# Patient Record
Sex: MALE | Race: WHITE | HISPANIC OR LATINO | Employment: UNEMPLOYED | URBAN - METROPOLITAN AREA
[De-identification: names, ages, dates, MRNs, and addresses within clinical notes are randomized per-mention and may not be internally consistent; named-entity substitution may affect disease eponyms.]

---

## 2018-01-11 NOTE — MISCELLANEOUS
Message   Recorded as Task   Date: 2016 08:44 AM, Created By: Desiree Berg   Task Name: Medical Complaint Callback   [facility]   Regarding Patient: Konrad Fried, Status: In Progress   CommentColinda Hammer - 03 Mar 2016 8:44 AM    TASK CREATED  Caller: Sho Christensen, Mother; Medical Complaint; [phone]  Pain on his knee  Appointment after 10:00  Call ASAP mother needs to go to work   Teri Cedeno - 03 Mar 2016 9:02 AM    TASK IN [facility] - 03 Mar 2016 9:03 AM    TASK EDITED  LM for parent to call back ASAP  This patient is 20yr old  Need to confirm   If this is the right pt, we cannot see him  Lizeth Llanos - 03 Mar 2016 4:19 PM    TASK EDITED  Msg left on vm requesting cb  Active Problems   1  Atypical chest pain (786 59) (R07 89)  2  Left knee pain (719 46) (M25 562)  3  Need for HPV vaccination (V04 89) (Z23)  4  Need for prophylactic vaccination and inoculation against meningococcus (V03 89) (Z23)  5  Patellar tendonitis (726 64) (M76 50)  6  Patellofemoral syndrome, left (719 46) (M22 2X2)    Current Meds  1  Ibuprofen 600 MG Oral Tablet; TAKE 1 TABLET 4 TIMES DAILY  WITH MEALS AS   NEEDED; Therapy: 13EUF8893 to (Evaluate:48Aqt2491)  Requested for: 72DXP1308; Last   Rx:[id number] Ordered  2  Naproxen 500 MG Oral Tablet; TAKE 1 TABLET EVERY 12 HOURS WITH FOOD AS   NEEDED; Therapy: 82OAP8647 to (Evaluate:04Jri6456); Last Rx:[id number] Ordered    Allergies   1   No Known Drug Allergies    Signatures   Electronically signed by : Harini Devlin, ; Mar  3 2016  4:20PM EST                       (Author)    Electronically signed by : Milena Zepeda, [facility]; Mar  3 2016  6:41PM EST                       (Author)

## 2018-01-16 NOTE — MISCELLANEOUS
Provider Comments  Provider Comments:   Patient is a no-show today for his 620 physical that he scheduled yesterday        Signatures   Electronically signed by : Baljinder Bone, [facility]; Oct 18 2016  6:42PM EST                       (Author)

## 2020-02-21 ENCOUNTER — OFFICE VISIT (OUTPATIENT)
Dept: FAMILY MEDICINE CLINIC | Facility: CLINIC | Age: 25
End: 2020-02-21

## 2020-02-21 VITALS
SYSTOLIC BLOOD PRESSURE: 140 MMHG | TEMPERATURE: 97.6 F | OXYGEN SATURATION: 98 % | WEIGHT: 225 LBS | HEIGHT: 72 IN | DIASTOLIC BLOOD PRESSURE: 60 MMHG | BODY MASS INDEX: 30.48 KG/M2 | RESPIRATION RATE: 16 BRPM | HEART RATE: 88 BPM

## 2020-02-21 DIAGNOSIS — Z02.4 DRIVER'S PERMIT PHYSICAL EXAMINATION: Primary | ICD-10-CM

## 2020-02-21 DIAGNOSIS — Z71.6 TOBACCO ABUSE COUNSELING: ICD-10-CM

## 2020-02-21 DIAGNOSIS — F17.200 TOBACCO DEPENDENCE: ICD-10-CM

## 2020-02-21 PROCEDURE — 3008F BODY MASS INDEX DOCD: CPT | Performed by: FAMILY MEDICINE

## 2020-02-21 PROCEDURE — 99203 OFFICE O/P NEW LOW 30 MIN: CPT | Performed by: FAMILY MEDICINE

## 2020-02-21 NOTE — PROGRESS NOTES
's Physical    Chief Complaint   Patient presents with    Physical Exam     NP here to [facility] care, DMV      /60 (BP Location: Left arm, Patient Position: Sitting, Cuff Size: Large)   Pulse 88   Temp 97 6 °F (36 4 °C) (Temporal)   Resp 16   Ht 5' 11 5" (1 816 m)   Wt 102 kg (225 lb)   SpO2 98%   BMI 30 94 kg/m²      Assessment   1  Normal 's physical  Reviewed medical conditions on 's physical form with patient in detail, all negative  2  Smoker in pre-contemplation phase  Plan   1  Advised to report back to [facility] immediately if any new conditions or limitations develop  Discussed importance of seat belt safety for  and all passengers in the car  Counseled on abstinence from alcohol, drugs, or substances which inhibit ability to operate a vehicle  Also counseled on abstinence from cell phone use or other devices which are distracting while operating a vehicle  Reviewed importance of familiarizing ones self with the rules and regulations outlined in drivers manual  2  Smoking cessation  3  Follow with PCP for routine primary care  There are no diagnoses linked to this encounter  Cherri Gracia Faisal is a 25 y o  male  Information for this visit was provided by patient  The language used was [place]  Patient is New patient  's physical  Patient here for 's physical  Works for a [employer]  Review of Systems   Constitutional: Negative for activity change, appetite change, chills and fever  HENT: Negative for hearing loss  Eyes: Negative for visual disturbance  Respiratory: Negative for shortness of breath  Cardiovascular: Negative for chest pain  Gastrointestinal: Negative for abdominal pain, blood in stool, constipation, diarrhea, nausea and vomiting  Genitourinary: Negative for difficulty urinating and dysuria  Skin: Negative for rash     Neurological: Negative for dizziness, seizures, syncope and light-headedness  Psychiatric/Behavioral: Negative for behavioral problems  Pertinent items are noted in HPI  Social History  - reports that he has been smoking  He has been smoking about 0 50 packs per day  He has never used smokeless tobacco   - reports that he does not drink alcohol    - reports that he does not use drugs  Objective     Physical Exam   Constitutional: He is oriented to person, place, and time  He appears well-developed and well-nourished  No distress  HENT:   Head: Normocephalic and atraumatic  Eyes: Pupils are equal, round, and reactive to light  Conjunctivae and EOM are normal    Neck: Normal range of motion  Cardiovascular: Normal rate, regular rhythm and normal heart sounds  No murmur heard  Pulmonary/Chest: Effort normal and breath sounds normal  No respiratory distress  He has no wheezes  Musculoskeletal: Normal range of motion  He exhibits no edema  Neurological: He is alert and oriented to person, place, and time  He exhibits normal muscle tone  Coordination normal    Psychiatric: He has a normal mood and affect  His behavior is normal    Nursing note and vitals reviewed  Health Information     The following have been reviewed and updated: none    No Known Allergies  No current outpatient medications on file  There is no problem list on file for this patient  No past surgical history on file  No family history on file    Health Maintenance   Topic Date Due    Pneumococcal Vaccine: Pediatrics (0 to 5 Years) and At-Risk Patients (6 to 59 Years) (1 of 1 - PPSV23) 11/17/2001    Hepatitis A Vaccine (2 of 2 - 2-dose series) 07/29/2009    HIV Screening  11/17/2010    BMI: Followup Plan  11/17/2013    Annual Physical  11/17/2013    HPV Vaccine (3 - Male 3-dose series) 09/21/2014    DTaP,Tdap,and Td Vaccines (7 - Td) 01/29/2019    Influenza Vaccine  07/01/2019    Depression Screening PHQ  02/21/2021    BMI: Adult  02/21/2021    Pneumococcal Vaccine: 65+ Years (1 of 2 - PCV13) 11/17/2060    HIB Vaccine  Completed    Hepatitis B Vaccine  Completed    Meningococcal ACWY Vaccine  Completed    IPV Vaccine  Aged Out

## 2020-02-21 NOTE — PATIENT INSTRUCTIONS

## 2020-04-14 ENCOUNTER — TELEPHONE (OUTPATIENT)
Dept: FAMILY MEDICINE CLINIC | Facility: CLINIC | Age: 25
End: 2020-04-14

## 2021-03-11 ENCOUNTER — OFFICE VISIT (OUTPATIENT)
Dept: FAMILY MEDICINE CLINIC | Facility: CLINIC | Age: 26
End: 2021-03-11

## 2021-03-11 VITALS
HEART RATE: 84 BPM | OXYGEN SATURATION: 99 % | DIASTOLIC BLOOD PRESSURE: 68 MMHG | TEMPERATURE: 96.6 F | SYSTOLIC BLOOD PRESSURE: 104 MMHG | RESPIRATION RATE: 18 BRPM | HEIGHT: 72 IN | BODY MASS INDEX: 26.36 KG/M2 | WEIGHT: 194.6 LBS

## 2021-03-11 DIAGNOSIS — M92.522 OSGOOD-SCHLATTER'S DISEASE, LEFT: ICD-10-CM

## 2021-03-11 DIAGNOSIS — Z02.4 DRIVER'S PERMIT PHYSICAL EXAMINATION: Primary | ICD-10-CM

## 2021-03-11 PROCEDURE — 3008F BODY MASS INDEX DOCD: CPT | Performed by: FAMILY MEDICINE

## 2021-03-11 PROCEDURE — 99213 OFFICE O/P EST LOW 20 MIN: CPT | Performed by: FAMILY MEDICINE

## 2021-03-11 PROCEDURE — 3725F SCREEN DEPRESSION PERFORMED: CPT | Performed by: FAMILY MEDICINE

## 2021-03-11 PROCEDURE — 4004F PT TOBACCO SCREEN RCVD TLK: CPT | Performed by: FAMILY MEDICINE

## 2021-03-11 NOTE — PROGRESS NOTES
Chief Complaint   Patient presents with    Physical Exam     drivers permit     Blood Pressure: 104/68, Pulse: 84, Respirations: 18, Temperature: (!) 96 6 °F (35 9 °C), Temp Source: Probe, SpO2: 99 %, Weight - Scale: 88 3 kg (194 lb 9 6 oz), Height: 6' 0 05" (183 cm), Pain Score: 0-No pain    Assessment/Plan  1  Normal 's physical   Reviewed medical conditions on 's physical form with patient in detail, all negative  Form completed and handed back to patient  Counseled on  safety, including seatbelts, driving while impaired, and distracted driving  2  History of Osgood-Schlatter's  Recommended discussing it with sports medicine  Most likely will need physical therapy  3  RTO p r n  The above was discussed in layman's terms  The patient was engaged using the shared-decision making process and verbalized understanding of the treatment plan  All questions were answered to the patient's satisfaction  Diagnoses and all orders for this visit:    's permit physical examination    Other orders  -     influenza vaccine, quadrivalent, 0 5 mL, preservative-free, for adult and pediatric patients 6 mos+ (AFLURIA, FLUARIX, FLULAVAL, FLUZONE)          Subjective/HPI  1  's physical   Medical history significant for Osgood-Harrisoner during early teenage years  He has off and on left knee pain and is interested in finding out if there is anything that can be done  Review of Systems  Constitutional: Negative for activity change, appetite change, chills and fever  Respiratory: Negative for shortness of breath  Cardiovascular: Negative for chest pain  Gastrointestinal: Negative for blood in stool, nausea and vomiting  Genitourinary: Negative for difficulty urinating and dysuria  Psychiatric/Behavioral: Negative for behavioral problems  Pertinent items are noted in chief complaint and HPI  Social History    reports that he has been smoking   He has been smoking about 0 50 packs per day  He has never used smokeless tobacco     reports no history of alcohol use    reports no history of drug use  Objective  Physical Exam   Constitutional: He is oriented to person, place, and time  He appears well-developed  No distress  HENT:   Head: Normocephalic and atraumatic  Eyes: Conjunctivae are normal    Neck: Normal range of motion  Cardiovascular: Normal rate, regular rhythm and normal heart sounds  No murmur heard  Pulmonary/Chest: Effort normal and breath sounds normal  No respiratory distress  He has no wheezes  Musculoskeletal: Normal range of motion  Neurological: He is alert and oriented to person, place, and time  Coordination and gait normal    Psychiatric: His behavior is normal    Nursing note and vitals reviewed  Chart Review/Health Maintenance   The following have been updated: none    No Known AllergiesNo current outpatient medications on file  There is no problem list on file for this patient  No past surgical history on file  No family history on file    Health Maintenance   Topic Date Due    Pneumococcal Vaccine: Pediatrics (0 to 5 Years) and At-Risk Patients (6 to 59 Years) (1 of 1 - PPSV23) 11/17/2001    Hepatitis A Vaccine (2 of 2 - 2-dose series) 07/29/2009    HIV Screening  11/17/2010    BMI: Followup Plan  11/17/2013    BMI: Adult  11/17/2013    Annual Physical  11/17/2013    HPV Vaccine (3 - Male 3-dose series) 09/21/2014    DTaP,Tdap,and Td Vaccines (7 - Td) 01/29/2019    Influenza Vaccine (1) 09/01/2020    Depression Screening PHQ  02/21/2021    HIB Vaccine  Completed    Hepatitis B Vaccine  Completed    Meningococcal ACWY Vaccine  Completed    IPV Vaccine  Aged Out

## 2021-06-29 ENCOUNTER — APPOINTMENT (OUTPATIENT)
Dept: RADIOLOGY | Facility: MEDICAL CENTER | Age: 26
End: 2021-06-29
Payer: COMMERCIAL

## 2021-06-29 VITALS
BODY MASS INDEX: 25.33 KG/M2 | HEIGHT: 72 IN | HEART RATE: 65 BPM | WEIGHT: 187 LBS | DIASTOLIC BLOOD PRESSURE: 74 MMHG | SYSTOLIC BLOOD PRESSURE: 135 MMHG

## 2021-06-29 DIAGNOSIS — M25.562 CHRONIC PAIN OF LEFT KNEE: Primary | ICD-10-CM

## 2021-06-29 DIAGNOSIS — M25.562 CHRONIC PAIN OF LEFT KNEE: ICD-10-CM

## 2021-06-29 DIAGNOSIS — G89.29 CHRONIC PAIN OF LEFT KNEE: ICD-10-CM

## 2021-06-29 DIAGNOSIS — M76.52 PATELLAR TENDINITIS OF LEFT KNEE: ICD-10-CM

## 2021-06-29 DIAGNOSIS — G89.29 CHRONIC PAIN OF LEFT KNEE: Primary | ICD-10-CM

## 2021-06-29 DIAGNOSIS — Z87.39 HISTORY OF OSGOOD-SCHLATTER DISEASE: ICD-10-CM

## 2021-06-29 PROCEDURE — 99244 OFF/OP CNSLTJ NEW/EST MOD 40: CPT | Performed by: STUDENT IN AN ORGANIZED HEALTH CARE EDUCATION/TRAINING PROGRAM

## 2021-06-29 PROCEDURE — 73562 X-RAY EXAM OF KNEE 3: CPT

## 2021-06-29 PROCEDURE — 4004F PT TOBACCO SCREEN RCVD TLK: CPT | Performed by: STUDENT IN AN ORGANIZED HEALTH CARE EDUCATION/TRAINING PROGRAM

## 2021-06-29 PROCEDURE — 3008F BODY MASS INDEX DOCD: CPT | Performed by: STUDENT IN AN ORGANIZED HEALTH CARE EDUCATION/TRAINING PROGRAM

## 2021-06-29 RX ORDER — DEXAMETHASONE SODIUM PHOSPHATE 4 MG/ML
4 INJECTION, SOLUTION INTRA-ARTICULAR; INTRALESIONAL; INTRAMUSCULAR; INTRAVENOUS; SOFT TISSUE AS NEEDED
Qty: 10 ML | Refills: 1 | Status: SHIPPED | OUTPATIENT
Start: 2021-06-29 | End: 2022-03-09 | Stop reason: ALTCHOICE

## 2021-06-29 NOTE — LETTER
Discussed CBC results with Cherri Bryant. All questions answered.    July 1, 2021     [address] [place] [address]    Patient: Emily An   YOB: 1995   Date of Visit: 6/29/2021       Dear Dr Haritha Baron:    Thank you for referring Oswaldo Manzanares to me for evaluation  Below are my notes for this consultation  If you have questions, please do not hesitate to call me  I look forward to following your patient along with you  Sincerely,        Kenya Wiggins MD        CC: No Recipients  Kenya Wiggins MD  7/1/2021  7:51 AM  Signed  1  Chronic pain of left knee  XR knee 3 vw left non injury    Patellar strap    Ambulatory referral to Physical Therapy    dexamethasone (DECADRON) 4 mg/mL   2  Patellar tendinitis of left knee  Patellar strap    Ambulatory referral to Physical Therapy    dexamethasone (DECADRON) 4 mg/mL   3  History of Osgood-Schlatter disease  Patellar strap    Ambulatory referral to Physical Therapy     Orders Placed This Encounter   Procedures    Patellar strap    XR knee 3 vw left non injury    Ambulatory referral to Physical Therapy        Imaging Studies (I personally reviewed images in PACS and report): 1  X-ray left knee 06/29/2021: No acute osseous abnormalities  Slight protuberance of tibial tuberosity    IMPRESSION:  Chronic left knee pain without a precipitating injury   Patellar tendinosis (DDx:  Patellofemoral pain syndrome)  History of Osgood-Schlatter's disease     consultation evaluation    PLAN:  - Clinical exam and radiographic imaging reviewed patient today, with impression as per above  I suspect given his h/o Osgood-Schlatter, his imaging and physical exam shows some protuberance of his tibial tuberosity (Lt>Rt) and does have pain over his patellar tendon on exam   - Patient agreeable to referral to formal physical therapy    - Prescribed patellar strap to be used during activity/ work but is okay to take off during rest and hygienic purposes  - Additionally prescribed Decadron for iontophoresis therapy while attending PT  - In regards to pain control, patient declined prescription NSAIDs  I discussed that he can use OTC NSAIDs/ acetaminophen, icing 20 minutes on/ off as needed for pain    Return in about 6 weeks (around 8/10/2021)  CHIEF COMPLAINT:  Left knee pain    HPI:  Gerber Hwang is a 22 y o  male  who presents in regards to referral from his PCP, Dr Priscilla Hurley, for       Visit 06/29/2021 :  Initial evaluation of left knee pain:  Of note patient reports a history of left knee Osgood-Schlatter disease in his teenage years  He reports his left knee has been an ongoing issue for the past 10 years without a precipitating injury but worsening over the past year  He reports pain located over the anteromedial aspect and "inside the kneecap," constant, and aching in quality with occasional sharp pains  The pain does radiate into his lower back  At its best, he rates the pain as a 5/10 in severity and at its worst he rates the pain as a 9/10  The pain has been ongoing for years, and he states that over the past year he has noticed it worsening  He denies any popping or clicking sensation  Denies knee swelling, numbness/ tingling, erythema, giving out sensation, knee locking in extension, and other ROS as per below  The pain worsens with prolonged ambulation and ascending/descending stairs  He has worsening pain toward the end of the day that does not completely resolve in the morning  He tried physical therapy in his teen years which did not alleviate the pain; he uses these same exercises now at home  He has tried icing, massage, and Advil with temporary relief  Denies prior injuries or surgeries of his left knee in the past     Review of Systems   Constitutional: Negative for chills, diaphoresis and fever  Respiratory: Negative for cough and shortness of breath  Gastrointestinal: Negative for abdominal pain, nausea and vomiting     Musculoskeletal:        As per HPI   Skin: Negative for rash  Neurological:        As per HPI         Medical, Surgical, Family, and Social History    History reviewed  No pertinent past medical history  History reviewed  No pertinent surgical history  Social History   Social History     Substance and Sexual Activity   Alcohol Use Never     Social History     Substance and Sexual Activity   Drug Use Yes    Types: Marijuana    Comment: medical      Social History     Tobacco Use   Smoking Status Current Every Day Smoker    Packs/day: 0 50    Types: Cigarettes   Smokeless Tobacco Never Used     History reviewed  No pertinent family history  No Known Allergies       Physical Exam  /74   Pulse 65   Ht 6' (1 829 m)   Wt 84 8 kg (187 lb)   BMI 25 36 kg/m²     Constitutional:  see vital signs  Gen: well-developed, normocephalic/atraumatic, well-groomed  Eyes: No inflammation or discharge of conjunctiva or lids; sclera clear   Pharynx: no inflammation, lesion, or mass of lips  Neck: supple, no masses, non-distended  MSK: no inflammation, lesion, mass, or clubbing of nails and digits except for other than mentioned below  SKIN: no visible rashes or skin lesions  Pulmonary/Chest: Effort normal  No respiratory distress     NEURO: cranial nerves grossly intact  PSYCH:  Alert and oriented to person, place, and time; recent and remote memory intact; mood normal, no depression, anxiety, or agitation, judgment and insight good and intact     Ortho Exam   Left Knee Exam:  Inspection:  Patient has increased protuberance of his tibial tuberosity of of his left knee compared to his right knee  Erythema: no  Swelling: no  Increased Warmth: no  Tenderness: No tenderness over medial/lateral joint line; pain over patellar tendon and tibial tuberosity  Sensation: equal bilaterally  ROM: 0-130 (  Full flexion aggravates pain over the patellar tendon)  Patellar Apprehension: negative  Patellar Grind: negative  Lachman's: negative  Anterior Drawer: negative  Varus laxity: negative  Valgus laxity: negative  Jong: negative   Thessaly Test: negative     Sensation intact to light touch      Left hip ROM demonstrates no pain actively or passively    No calf tenderness to palpation

## 2021-06-29 NOTE — PROGRESS NOTES
1  Chronic pain of left knee  XR knee 3 vw left non injury    Patellar strap    Ambulatory referral to Physical Therapy    dexamethasone (DECADRON) 4 mg/mL   2  Patellar tendinitis of left knee  Patellar strap    Ambulatory referral to Physical Therapy    dexamethasone (DECADRON) 4 mg/mL   3  History of Osgood-Schlatter disease  Patellar strap    Ambulatory referral to Physical Therapy     Orders Placed This Encounter   Procedures    Patellar strap    XR knee 3 vw left non injury    Ambulatory referral to Physical Therapy        Imaging Studies (I personally reviewed images in PACS and report): 1  X-ray left knee 06/29/2021: No acute osseous abnormalities  Slight protuberance of tibial tuberosity    IMPRESSION:  Chronic left knee pain without a precipitating injury   Patellar tendinosis (DDx:  Patellofemoral pain syndrome)  History of Osgood-Schlatter's disease     consultation evaluation    PLAN:  - Clinical exam and radiographic imaging reviewed patient today, with impression as per above  I suspect given his h/o Osgood-Schlatter, his imaging and physical exam shows some protuberance of his tibial tuberosity (Lt>Rt) and does have pain over his patellar tendon on exam   - Patient agreeable to referral to formal physical therapy  - Prescribed patellar strap to be used during activity/ work but is okay to take off during rest and hygienic purposes  - Additionally prescribed Decadron for iontophoresis therapy while attending PT  - In regards to pain control, patient declined prescription NSAIDs  I discussed that he can use OTC NSAIDs/ acetaminophen, icing 20 minutes on/ off as needed for pain    Return in about 6 weeks (around 8/10/2021)        CHIEF COMPLAINT:  Left knee pain    HPI:  Shahana Henning is a 22 y o  male  who presents in regards to referral from his PCP, Dr Dereck Amezquita, for       Visit 06/29/2021 :  Initial evaluation of left knee pain:  Of note patient reports a history of left knee Osgood-Schlatter disease in his teenage years  He reports his left knee has been an ongoing issue for the past 10 years without a precipitating injury but worsening over the past year  He reports pain located over the anteromedial aspect and "inside the kneecap," constant, and aching in quality with occasional sharp pains  The pain does radiate into his lower back  At its best, he rates the pain as a 5/10 in severity and at its worst he rates the pain as a 9/10  The pain has been ongoing for years, and he states that over the past year he has noticed it worsening  He denies any popping or clicking sensation  Denies knee swelling, numbness/ tingling, erythema, giving out sensation, knee locking in extension, and other ROS as per below  The pain worsens with prolonged ambulation and ascending/descending stairs  He has worsening pain toward the end of the day that does not completely resolve in the morning  He tried physical therapy in his teen years which did not alleviate the pain; he uses these same exercises now at home  He has tried icing, massage, and Advil with temporary relief  Denies prior injuries or surgeries of his left knee in the past     Review of Systems   Constitutional: Negative for chills, diaphoresis and fever  Respiratory: Negative for cough and shortness of breath  Gastrointestinal: Negative for abdominal pain, nausea and vomiting  Musculoskeletal:        As per HPI   Skin: Negative for rash  Neurological:        As per HPI         Medical, Surgical, Family, and Social History    History reviewed  No pertinent past medical history  History reviewed  No pertinent surgical history    Social History   Social History     Substance and Sexual Activity   Alcohol Use Never     Social History     Substance and Sexual Activity   Drug Use Yes    Types: Marijuana    Comment: medical      Social History     Tobacco Use   Smoking Status Current Every Day Smoker    Packs/day: 0 50    Types: Cigarettes   Smokeless Tobacco Never Used     History reviewed  No pertinent family history  No Known Allergies       Physical Exam  /74   Pulse 65   Ht 6' (1 829 m)   Wt 84 8 kg (187 lb)   BMI 25 36 kg/m²     Constitutional:  see vital signs  Gen: well-developed, normocephalic/atraumatic, well-groomed  Eyes: No inflammation or discharge of conjunctiva or lids; sclera clear   Pharynx: no inflammation, lesion, or mass of lips  Neck: supple, no masses, non-distended  MSK: no inflammation, lesion, mass, or clubbing of nails and digits except for other than mentioned below  SKIN: no visible rashes or skin lesions  Pulmonary/Chest: Effort normal  No respiratory distress     NEURO: cranial nerves grossly intact  PSYCH:  Alert and oriented to person, place, and time; recent and remote memory intact; mood normal, no depression, anxiety, or agitation, judgment and insight good and intact     Ortho Exam   Left Knee Exam:  Inspection:  Patient has increased protuberance of his tibial tuberosity of of his left knee compared to his right knee  Erythema: no  Swelling: no  Increased Warmth: no  Tenderness: No tenderness over medial/lateral joint line; pain over patellar tendon and tibial tuberosity  Sensation: equal bilaterally  ROM: 0-130 (  Full flexion aggravates pain over the patellar tendon)  Patellar Apprehension: negative  Patellar Grind: negative  Lachman's: negative  Anterior Drawer: negative  Varus laxity: negative  Valgus laxity: negative  Jong: negative   Thessaly Test: negative     Sensation intact to light touch      Left hip ROM demonstrates no pain actively or passively    No calf tenderness to palpation

## 2021-07-12 ENCOUNTER — EVALUATION (OUTPATIENT)
Dept: PHYSICAL THERAPY | Facility: MEDICAL CENTER | Age: 26
End: 2021-07-12
Payer: COMMERCIAL

## 2021-07-12 DIAGNOSIS — M76.52 PATELLAR TENDINITIS OF LEFT KNEE: ICD-10-CM

## 2021-07-12 DIAGNOSIS — M25.562 CHRONIC PAIN OF LEFT KNEE: Primary | ICD-10-CM

## 2021-07-12 DIAGNOSIS — G89.29 CHRONIC PAIN OF LEFT KNEE: Primary | ICD-10-CM

## 2021-07-12 DIAGNOSIS — Z87.39 HISTORY OF OSGOOD-SCHLATTER DISEASE: ICD-10-CM

## 2021-07-12 PROCEDURE — 97161 PT EVAL LOW COMPLEX 20 MIN: CPT | Performed by: PHYSICAL THERAPIST

## 2021-07-12 PROCEDURE — 97110 THERAPEUTIC EXERCISES: CPT | Performed by: PHYSICAL THERAPIST

## 2021-07-12 NOTE — PROGRESS NOTES
PT Evaluation     Today's date: 2021  Patient name: Kelli Renteria  : 1995  MRN: [id number]  Referring provider: Leon Hodges MD  Dx:   Encounter Diagnoses   Name Primary?  Chronic pain of left knee     Patellar tendinitis of left knee     History of Osgood-Schlatter disease                   Assessment  Assessment details: Kelli Renteria is a 21 y/o male who presents with complaints of chronic left knee pain  The patients greatest concern is pain c/ daily activities and limitation in activity tolerance  Primary movement impairment diagnosis of L LE weakness and poor posterior chain neuromuscular control, resulting in pathoanatomical symptoms of chronic pain of left knee, patellar tendinitis of left knee, and history of Osgood-Schlatter disease, which limits his ability to perform functional activities without pain  Pt  will benefit from skilled PT services that includes manual therapy techniques to enhance tissue extensibility, neuromuscular re-education to facilitate motor control, therapeutic exercise to increase functional mobility, and modalities prn to reduce pain and inflammation    Impairments: abnormal coordination, abnormal muscle firing, activity intolerance, impaired balance, impaired physical strength, lacks appropriate home exercise program, pain with function and poor body mechanics  Barriers to therapy: Chronicity of pain  Understanding of Dx/Px/POC: good   Prognosis: good    Goals  Impairment Goals  - Pt I with initial HEP in 1-2 visits  - Improve ROM equal to contralateral side in 4-6 weeks  - Increase strength to 5/5 in all affected areas in 4-6 weeks    Functional Goals  - Increase Functional Status Measure to: 58 in 6-8 weeks  - Patient will be independent with comprehensive HEP in 6-8 weeks  - Ambulation is improved to prior level of function in 6-8 weeks  - Stair climbing is improved to prior level of function in 6-8 weeks  - Squatting is improved to prior level of function in 6-8 weeks    Plan  Patient would benefit from: PT eval  Planned modality interventions: iontophoresis, thermotherapy: hydrocollator packs, cryotherapy and low level laser therapy  Planned therapy interventions: joint mobilization, manual therapy, neuromuscular re-education, patient education, postural training, strengthening, stretching, therapeutic exercise, home exercise program, graded exercise, graded activity, flexibility, body mechanics training, balance and abdominal trunk stabilization  Frequency: 2-3x/week  Duration in weeks: 8  Treatment plan discussed with: patient      Subjective Evaluation    History of Present Illness  Mechanism of injury: Patient states that he has been experiencing L knee pain for over 10 years  He has a hx of osgood-schlatter disease  Patient reports occasional back pain and stiffness  He notes that he is experiencing tightness and burning in the left knee, mostly in the anteromedial region  Over the past year, he has been trying to lose weight and dropped over 30 lbs by walking and making healthy eating choices  Patient is not currently working  He does not lift weights, or exercise regularly  Pain  Current pain ratin  At best pain ratin  At worst pain ratin  Quality: throbbing and discomfort  Alleviating factors: massage, elevation  Diagnostic Tests  X-ray: normal  Treatments  Previous treatment: physical therapy  Current treatment: physical therapy  Patient Goals  Patient goals for therapy: decreased pain, increased strength, return to sport/leisure activities and independence with ADLs/IADLs        Objective     Observations     Right Knee   Negative for effusion and trophic changes  Additional Observation Details  L tibial tuberosity protuberance    Tenderness   Left Knee   Tenderness in the inferior fat pad and inferior patella  No tenderness in the lateral joint line and medial joint line       Neurological Testing     Sensation Knee   Left Knee   Intact: light touch    Right Knee   Intact: light touch     Additional Neurological Details  Reflexes NT  Active Range of Motion   Left Knee   Normal active range of motion    Right Knee   Normal active range of motion    Passive Range of Motion   Left Hip   Normal passive range of motion    Right Hip   Normal passive range of motion    Mobility   Patellar Mobility:   Left Knee   WFL: medial, lateral, superior and inferior  Right Knee   WFL: medial, lateral, superior and inferior  Tibiofemoral Mobility:   Left knee   Tibiofemoral tendons within functional limits include the anterior and posterior  Right knee Tibiofemoral tendons within functional limits include the anterior and posterior  Patellar Static Positioning   Left Knee: WFL  Right Knee: [facility]    Strength/Myotome Testing     Left Knee   Flexion: 4-  Extension: 4-  Quadriceps contraction: fair    Right Knee   Flexion: 5  Extension: 5  Quadriceps contraction: good    Tests     Lumbar     Left   Negative passive SLR  Right   Negative passive SLR  Left Hip   Positive Ely's and long sit  90/90 SLR: Positive  Right Hip   Positive Ely's  90/90 SLR: Positive  Left Knee   Negative anterior drawer, anterior Lachman, lateral Kd, medial Kd, patellar apprehension, patellar compression, patella-femoral grind, posterior drawer, valgus stress test at 0 degrees, valgus stress test at 30 degrees, varus stress test at 0 degrees and varus stress test at 30 degrees  Additional Tests Details  (+) Functional leg length discrepancy c/ L LE longer    Functional Assessment      Squat    Left tibial anterior translation beyond toes, sitting toward right side and right tibial anterior translation beyond toes  Single Leg Squat   Left Leg  Anterior tibial translation beyond toes  Right Leg  Tibial anterior translation beyond toes  Single Leg Stance   Left single leg stance time: 10s/unstable    Right single leg stance time: 10s  Posterior weight shift: poor    Depth of femur height: above 90 degrees    General Comments:      Knee Comments  No swelling noted         Precautions:  N/A    Manuals 7/12            STM/IASTM                                                    Neuro Re-Ed                                                    Ther Ex             Quad sets 30x5s            SLRs 2x10                         Bridges 2x10 5s                                                                Ther Activity                                       Gait Training                                       Modalities             Karena Campbell, PT  7/12/2021,10:14 AM

## 2021-07-14 ENCOUNTER — OFFICE VISIT (OUTPATIENT)
Dept: PHYSICAL THERAPY | Facility: MEDICAL CENTER | Age: 26
End: 2021-07-14
Payer: COMMERCIAL

## 2021-07-14 DIAGNOSIS — Z87.39 HISTORY OF OSGOOD-SCHLATTER DISEASE: ICD-10-CM

## 2021-07-14 DIAGNOSIS — M25.562 CHRONIC PAIN OF LEFT KNEE: Primary | ICD-10-CM

## 2021-07-14 DIAGNOSIS — M76.52 PATELLAR TENDINITIS OF LEFT KNEE: ICD-10-CM

## 2021-07-14 DIAGNOSIS — G89.29 CHRONIC PAIN OF LEFT KNEE: Primary | ICD-10-CM

## 2021-07-14 PROCEDURE — 97110 THERAPEUTIC EXERCISES: CPT | Performed by: PHYSICAL THERAPIST

## 2021-07-14 PROCEDURE — 97140 MANUAL THERAPY 1/> REGIONS: CPT | Performed by: PHYSICAL THERAPIST

## 2021-07-14 NOTE — PROGRESS NOTES
Daily Note     Today's date: 2021  Patient name: Abner Juárez  : 1995  MRN: [id number]  Referring provider: Soco Wilcox MD  Dx:   Encounter Diagnosis     ICD-10-CM    1  Chronic pain of left knee  M25 562     G89 29    2  Patellar tendinitis of left knee  M76 52    3  History of Osgood-Schlatter disease  Z87 39                   Subjective:  Patient states that he is doing well  Objective: See treatment diary below  Assessment:  Patient demonstrates fatigue c/ SLRs in all directions  He demonstrates fatigue after hip adduction  Patient did well c/ progressions  Tolerated treatment well  Patient would benefit from continued PT  Plan: Continue per plan of care        Precautions:  N/A    Manuals            STM/IASTM             Patellar mobs  AZ                                     Neuro Re-Ed                                                    Ther Ex             Quad sets 30x5s 30x5           SLRsx4 2x10 3x10           Hip add  20x5s           Bridges 2x10 5s 3x10 5s           Clams                                                    Ther Activity                                       Gait Training                                       Modalities             Ionto X X

## 2021-07-16 ENCOUNTER — APPOINTMENT (OUTPATIENT)
Dept: PHYSICAL THERAPY | Facility: MEDICAL CENTER | Age: 26
End: 2021-07-16
Payer: COMMERCIAL

## 2021-07-26 ENCOUNTER — APPOINTMENT (OUTPATIENT)
Dept: PHYSICAL THERAPY | Facility: MEDICAL CENTER | Age: 26
End: 2021-07-26
Payer: COMMERCIAL

## 2021-07-28 ENCOUNTER — APPOINTMENT (OUTPATIENT)
Dept: PHYSICAL THERAPY | Facility: MEDICAL CENTER | Age: 26
End: 2021-07-28
Payer: COMMERCIAL

## 2022-03-08 ENCOUNTER — TELEPHONE (OUTPATIENT)
Dept: PSYCHIATRY | Facility: CLINIC | Age: 27
End: 2022-03-08

## 2022-03-08 NOTE — TELEPHONE ENCOUNTER
PT mom called looking for np appointment, pt mom called [address] and they gave her this number   PT mom was suggest to call the number on the back of insurance card and placed on wait list, mom is going to call pt pcp to get a referral

## 2022-03-09 ENCOUNTER — HOSPITAL ENCOUNTER (EMERGENCY)
Facility: HOSPITAL | Age: 27
Discharge: HOME | End: 2022-03-09
Attending: EMERGENCY MEDICINE
Payer: MEDICARE

## 2022-03-09 ENCOUNTER — APPOINTMENT (EMERGENCY)
Dept: RADIOLOGY | Facility: HOSPITAL | Age: 27
End: 2022-03-09
Payer: MEDICARE

## 2022-03-09 VITALS
BODY MASS INDEX: 25.08 KG/M2 | WEIGHT: 185.19 LBS | HEART RATE: 72 BPM | HEIGHT: 72 IN | SYSTOLIC BLOOD PRESSURE: 143 MMHG | DIASTOLIC BLOOD PRESSURE: 68 MMHG | RESPIRATION RATE: 12 BRPM | OXYGEN SATURATION: 98 % | TEMPERATURE: 98 F

## 2022-03-09 DIAGNOSIS — S96.911A STRAIN OF RIGHT FOOT, INITIAL ENCOUNTER: Primary | ICD-10-CM

## 2022-03-09 DIAGNOSIS — M79.671 RIGHT FOOT PAIN: ICD-10-CM

## 2022-03-09 DIAGNOSIS — M25.562 CHRONIC PAIN OF LEFT KNEE: ICD-10-CM

## 2022-03-09 DIAGNOSIS — M79.641 RIGHT HAND PAIN: ICD-10-CM

## 2022-03-09 DIAGNOSIS — G89.29 CHRONIC PAIN OF LEFT KNEE: ICD-10-CM

## 2022-03-09 PROCEDURE — 73130 X-RAY EXAM OF HAND: CPT

## 2022-03-09 PROCEDURE — 73630 X-RAY EXAM OF FOOT: CPT

## 2022-03-09 PROCEDURE — 99283 EMERGENCY DEPT VISIT LOW MDM: CPT

## 2022-03-09 PROCEDURE — 73564 X-RAY EXAM KNEE 4 OR MORE: CPT

## 2022-03-09 PROCEDURE — 99284 EMERGENCY DEPT VISIT MOD MDM: CPT

## 2022-03-09 NOTE — DISCHARGE INSTRUCTIONS
-Recommend you follow up with ortho for your left knee pain   -ice your right hand and right foot 10 minutes at a time    -Use post op shoe as needed for right foot   -can use Tylenol/Motrin for pain as needed  -Please return to ED or PCP if symptoms worsen or fail to improve

## 2022-03-09 NOTE — ED PROVIDER NOTES
History  Chief Complaint   Patient presents with    Pain     patient slipped and fell at [facility], c/o pain to right foot and left knee  This is a 32 YOM with history of patellar tendinitis of left knee who presents today after a fall at [facility]  He states he was going to leave the [facility] and slipped and fell hitting his right hand, right hand and left knee  He complains of pain in these areas  He states he has chronic left knee pain from "jumper's knee" and has been seeing ortho and has completed PT  He denies any changes to the pain in his left knee  He also reports right foot pain on the medial aspect that radiates to the great toe  He denies pain in the medial malleolus or radiating to his ankle  Pt is able to ambulate  Pt also complains of right hand pain at MCP joints 2-5  Motor, sensory and pulses are intact in all extremities  He denies hitting his head or losing consciousness, back and neck pain  None       History reviewed  No pertinent past medical history  History reviewed  No pertinent surgical history  History reviewed  No pertinent family history  I have reviewed and agree with the history as documented  E-Cigarette/Vaping    E-Cigarette Use Never User      E-Cigarette/Vaping Substances    Nicotine No     THC No     CBD No     Flavoring No     Other No     Unknown No      Social History     Tobacco Use    Smoking status: Current Every Day Smoker     Packs/day: 0 50     Types: Cigarettes    Smokeless tobacco: Never Used   Vaping Use    Vaping Use: Never used   Substance Use Topics    Alcohol use: Never    Drug use: Yes     Types: Marijuana     Comment: medical        Review of Systems   Respiratory: Negative for shortness of breath  Cardiovascular: Negative for chest pain  Musculoskeletal: Positive for arthralgias (left knee, right foot, right hand)  Negative for back pain, gait problem, joint swelling, myalgias, neck pain and neck stiffness     Skin: Negative for color change and wound  Neurological: Negative for dizziness, syncope, light-headedness, numbness and headaches  All other systems reviewed and are negative  Physical Exam  Physical Exam  Vitals and nursing note reviewed  Constitutional:       General: He is not in acute distress  Appearance: Normal appearance  He is well-developed  HENT:      Head: Normocephalic and atraumatic  Cardiovascular:      Rate and Rhythm: Normal rate and regular rhythm  Pulmonary:      Effort: No respiratory distress  Musculoskeletal:      Right upper arm: Normal       Left upper arm: Normal       Right wrist: Normal       Left wrist: Normal       Right hand: Swelling (mild, MCP joints 2-5) present  No deformity, tenderness or bony tenderness  Normal range of motion  Normal strength  Normal sensation  Normal capillary refill  Normal pulse  Cervical back: Normal range of motion and neck supple  No rigidity or tenderness  Right upper leg: Normal       Left upper leg: Normal       Right knee: Normal       Left knee: No swelling, deformity, effusion, erythema, ecchymosis, bony tenderness or crepitus  Normal range of motion  Tenderness present over the medial joint line and lateral joint line  No patellar tendon tenderness  Normal alignment, normal meniscus and normal patellar mobility  Normal pulse  Right ankle: Normal       Left ankle: Normal       Right foot: Normal range of motion  Tenderness (medial aspect of foot) present  No swelling, deformity or bony tenderness  Normal pulse  Left foot: Normal    Skin:     General: Skin is warm and dry  Neurological:      General: No focal deficit present  Mental Status: He is alert and oriented to person, place, and time     Psychiatric:         Mood and Affect: Mood normal          Behavior: Behavior normal          Vital Signs  ED Triage Vitals [03/09/22 1055]   Temperature Pulse Respirations Blood Pressure SpO2   98 °F (36 7 °C) 72 12 143/68 98 % Temp src Heart Rate Source Patient Position - Orthostatic VS BP Location FiO2 (%)   -- Monitor -- Right arm --      Pain Score       9           Vitals:    03/09/22 1055   BP: 143/68   Pulse: 72         Visual Acuity      ED Medications  Medications - No data to display    Diagnostic Studies  Results Reviewed     None                 XR hand 3+ views RIGHT   Final Result by Tania Pringle MD (03/09 1338)      No acute osseous abnormality  Workstation performed: [id number]         XR knee 4+ views left injury   Final Result by Tania Pringle MD (03/09 1337)      No acute osseous abnormality  Findings are stable      Workstation performed: [id number]         XR foot 3+ views RIGHT   Final Result by Tania Pringle MD (03/09 1337)      No acute osseous abnormality  Workstation performed: [id number]                    Procedures  Procedures         ED Course                               SBIRT 20yo+      Most Recent Value   SBIRT (24 yo +)    In order to provide better care to our patients, we are screening all of our patients for alcohol and drug use  Would it be okay to ask you these screening questions? No Filed at: 03/09/2022 1107                    MDM  Number of Diagnoses or Management Options  Chronic pain of left knee: established and improving  Right foot pain: new and does not require workup  Right hand pain: new and does not require workup  Strain of right foot, initial encounter: new and does not require workup  Diagnosis management comments: Xrays were independently reviewed by me  No bony abnormalities noted  Recommend icing right hand as needed and following up with ortho for left knee pain  A post op shoe will be applied to the right foot and he can wear that as needed as well as ice  Crutches were offered to pt but he denied  Tylenol/Motrin as needed for pain  He can return to work tomorrow       The management plan was discussed in detail with the patient at bedside and all questions were answered  Gera Lea to discharge, we provided both verbal and written instructions   We discussed with the patient the signs and symptoms for which to return to the emergency department   All questions were answered and patient was comfortable with the plan of care and discharged to home   Instructed the patient to follow up with the primary care provider and/or specialist provided and their written instructions   The patient verbalized understanding of our discussion and plan of care, and agrees to return to the Emergency Department for concerns and progression of illness      At discharge, I instructed the patient to:  -RICE- rest, ice, compression, elevation for right foot pain   -ice for right hand pain  -follow up with pcp  -follow up with the recommended specialists- ortho for left knee pain  -return to the ER if symptoms worsened or new symptoms arose  Patient agreed to this plan and was stable at time of discharge           Amount and/or Complexity of Data Reviewed  Tests in the radiology section of CPT®: ordered and reviewed  Decide to obtain previous medical records or to obtain history from someone other than the patient: yes  Review and summarize past medical records: yes  Independent visualization of images, tracings, or specimens: yes        Disposition  Final diagnoses:   Chronic pain of left knee   Right hand pain   Right foot pain   Strain of right foot, initial encounter     Time reflects when diagnosis was documented in both MDM as applicable and the Disposition within this note     Time User Action Codes Description Comment    3/9/2022 12:15 PM Ubaldo Singaporean Add [G85 847,  G89 29] Chronic pain of left knee     3/9/2022 12:15 PM Ubaldo Singaporean Add [E57 734] Right hand pain     3/9/2022 12:15 PM Ubaldo Singaporean Add [S24 254] Right foot pain     3/9/2022 12:15 PM Ubaldo Singaporean Add [F59 666Q] Strain of right foot, initial encounter     3/9/2022 12:16 PM Kaylee De La Torre Curtis Malloy Modify [D48 583,  G89 29] Chronic pain of left knee     3/9/2022 12:16 PM Ezekiel Hyde Modify [E12 493I] Strain of right foot, initial encounter       ED Disposition     ED Disposition Condition Date/Time Comment    Discharge Stable Wed Mar 9, 2022 12:15  Jaret Norton  discharge to home/self care  Follow-up Information     Follow up With Specialties Details Why Contact Info Additional [address] [facility] Orthopedic Surgery Schedule an appointment as soon as possible for a visit  As needed- for chronic left knee pain [address] [facility]  [place] [address] [facility] [address] [phone] [address], [address] [facility], [address] [place], [place], [id number] [phone]          There are no discharge medications for this patient  No discharge procedures on file      PDMP Review     None          ED Provider  Electronically Signed by           Rajan Menchcaa PA-C  03/09/22 9575

## 2022-03-09 NOTE — Clinical Note
Abbie Andrade was seen and treated in our emergency department on 3/9/2022  No restrictions            Diagnosis:     Sunday Elizabeth  may return to work on return date  He may return on this date: 03/10/2022    Pt was seen in the ED today after a fall  He can return to work tomorrow 3/9/22  He can wear a post op shoe as needed  If you have any questions or concerns, please don't hesitate to call        Sruthi Haley MD    ______________________________           _______________          _______________  Hospital Representative                              Date                                Time

## 2022-03-09 NOTE — Clinical Note
Otoniel Ramirez was seen and treated in our emergency department on 3/9/2022  No restrictions            Diagnosis:     Fernando Crawford  may return to work on return date  He may return on this date: 03/10/2022    Pt was seen in the ED today after a fall  He can return to work tomorrow 3/9/22  He can wear a post op shoe as needed  If you have any questions or concerns, please don't hesitate to call        Sg Harris PA-C    ______________________________           _______________          _______________  Hospital Representative                              Date                                Time

## 2022-03-14 ENCOUNTER — OFFICE VISIT (OUTPATIENT)
Dept: FAMILY MEDICINE CLINIC | Facility: CLINIC | Age: 27
End: 2022-03-14

## 2022-03-14 VITALS
RESPIRATION RATE: 18 BRPM | WEIGHT: 189 LBS | HEIGHT: 72 IN | DIASTOLIC BLOOD PRESSURE: 70 MMHG | HEART RATE: 84 BPM | OXYGEN SATURATION: 99 % | SYSTOLIC BLOOD PRESSURE: 136 MMHG | TEMPERATURE: 98.1 F | BODY MASS INDEX: 25.6 KG/M2

## 2022-03-14 DIAGNOSIS — M76.52 PATELLAR TENDINITIS OF LEFT KNEE: Primary | ICD-10-CM

## 2022-03-14 DIAGNOSIS — M54.50 ACUTE BILATERAL LOW BACK PAIN WITHOUT SCIATICA: ICD-10-CM

## 2022-03-14 PROCEDURE — 99213 OFFICE O/P EST LOW 20 MIN: CPT | Performed by: FAMILY MEDICINE

## 2022-03-14 RX ORDER — LIDOCAINE 50 MG/G
1 PATCH TOPICAL DAILY
Qty: 14 PATCH | Refills: 1 | Status: SHIPPED | OUTPATIENT
Start: 2022-03-14

## 2022-03-14 RX ORDER — NAPROXEN 500 MG/1
500 TABLET ORAL 2 TIMES DAILY WITH MEALS
Qty: 60 TABLET | Refills: 0 | Status: SHIPPED | OUTPATIENT
Start: 2022-03-14 | End: 2022-04-13

## 2022-03-14 RX ORDER — METHOCARBAMOL 500 MG/1
500 TABLET, FILM COATED ORAL 4 TIMES DAILY
Qty: 56 TABLET | Refills: 0 | Status: SHIPPED | OUTPATIENT
Start: 2022-03-14 | End: 2022-03-29

## 2022-03-14 NOTE — ASSESSMENT & PLAN NOTE
-No red flags symptoms  -Recommend Tylenol a 1000 mg q 8, naproxen 500 b i d  for 7-14, and warm compress  -Discussed some stretching exercises for home  -Will send to physical therapy for persistent symptoms

## 2022-03-14 NOTE — PROGRESS NOTES
Assessment/Plan:    Patellar tendonitis  -Will recommend NSAIDs scheduled for 7-14 days  -Continue ice therapy p r n   -Recommend patellar brace  -Follow-up with sports medicine and physical therapy    Acute bilateral low back pain without sciatica  -No red flags symptoms  -Recommend Tylenol a 1000 mg q 8, naproxen 500 b i d  for 7-14, and warm compress  -Discussed some stretching exercises for home  -Will send to physical therapy for persistent symptoms      Return in about 1 week (around 3/21/2022) for Next scheduled follow up, Annual physical       There are no Patient Instructions on file for this visit  Diagnoses and all orders for this visit:    Patellar tendinitis of left knee  -     methocarbamol (ROBAXIN) 500 mg tablet; Take 1 tablet (500 mg total) by mouth 4 (four) times a day for 14 days  -     lidocaine (Lidoderm) 5 %; Apply 1 patch topically daily Remove & Discard patch within 12 hours or as directed by MD  -     naproxen (Naprosyn) 500 mg tablet; Take 1 tablet (500 mg total) by mouth 2 (two) times a day with meals    Acute bilateral low back pain without sciatica          Subjective:     Ania Rajput is a 32 y o  male who  has no past medical history on file  who presented to the office today for emergency room follow-up  He had a mechanical fall about 5 days ago after he slipped and fell backwards  He did not experience any head trauma or loss of consciousness  Since then he has been having back pain and knee pain  He was recently in the emergency room for evaluation  His pain is in his lower back  He denies falling directly on his back and was able to brace his fall with his hands  He is only taking ibuprofen as needed for pain  He rates his pain as 7/10  He denies any bowel or bladder incontinence or any lower extremity weakness  He has also been having knee pain that is chronic    He was previously diagnosed with patella tendinitis and he was following with Sports Medicine and Physical therapy  He states that the physical therapy sessions were too strenuous and he did not feel like he had much benefit from the sessions  His his knee pain has been more painful from baseline after he fell  He has no issues with ambulation  He had x-rays done of his knees which was unremarkable  HPI        No current outpatient medications on file prior to visit  No current facility-administered medications on file prior to visit  No past medical history on file  No past surgical history on file  Social History     Socioeconomic History    Marital status: Single     Spouse name: None    Number of children: None    Years of education: None    Highest education level: None   Occupational History    None   Tobacco Use    Smoking status: Current Every Day Smoker     Packs/day: 0 50     Types: Cigarettes    Smokeless tobacco: Never Used   Vaping Use    Vaping Use: Never used   Substance and Sexual Activity    Alcohol use: Never    Drug use: Yes     Types: Marijuana     Comment: medical     Sexual activity: None   Other Topics Concern    None   Social History Narrative    None     Social Determinants of Health     Financial Resource Strain: Not on file   Food Insecurity: Not on file   Transportation Needs: Not on file   Physical Activity: Not on file   Stress: Not on file   Social Connections: Not on file   Intimate Partner Violence: Not on file   Housing Stability: Not on file     No family history on file  Review of Systems   Constitutional: Negative for chills, fatigue and fever  Respiratory: Negative for shortness of breath and wheezing  Cardiovascular: Negative for chest pain, palpitations and leg swelling  Gastrointestinal: Negative for abdominal pain, constipation, diarrhea, nausea and vomiting  Musculoskeletal: Positive for arthralgias, back pain and myalgias  Negative for gait problem  Skin: Negative for wound     Neurological: Negative for weakness and numbness  Objective:    /70 (BP Location: Left arm, Patient Position: Sitting, Cuff Size: Adult)   Pulse 84   Temp 98 1 °F (36 7 °C) (Temporal)   Resp 18   Ht 6' (1 829 m)   Wt 85 7 kg (189 lb)   SpO2 99%   BMI 25 63 kg/m²     Physical Exam  Constitutional:       General: He is not in acute distress  Appearance: Normal appearance  He is well-developed  He is not ill-appearing, toxic-appearing or diaphoretic  HENT:      Head: Normocephalic and atraumatic  Nose: Nose normal       Mouth/Throat:      Pharynx: No oropharyngeal exudate  Eyes:      Extraocular Movements: Extraocular movements intact  Conjunctiva/sclera: Conjunctivae normal    Cardiovascular:      Rate and Rhythm: Normal rate and regular rhythm  Heart sounds: Normal heart sounds  No murmur heard  No friction rub  No gallop  Pulmonary:      Effort: Pulmonary effort is normal  No respiratory distress  Breath sounds: Normal breath sounds  No stridor  No wheezing or rhonchi  Abdominal:      General: Bowel sounds are normal  There is no distension  Palpations: Abdomen is soft  There is no mass  Tenderness: There is no abdominal tenderness  There is no guarding or rebound  Hernia: No hernia is present  Musculoskeletal:         General: No deformity or signs of injury  Normal range of motion  Cervical back: Normal range of motion and neck supple  No tenderness or bony tenderness  Thoracic back: No tenderness or bony tenderness  Lumbar back: Tenderness (Paraspinal muscle tenderness) present  No bony tenderness  Negative right straight leg raise test and negative left straight leg raise test       Right knee: No swelling, deformity, effusion, erythema, ecchymosis, bony tenderness or crepitus  Normal range of motion  No medial joint line, lateral joint line or MCL tenderness  Instability Tests: Anterior Lachman test negative  Lateral Kd test negative        Left knee: No swelling, deformity, effusion, erythema, ecchymosis, bony tenderness or crepitus  Normal range of motion  Tenderness present over the patellar tendon  No medial joint line, lateral joint line or MCL tenderness  Instability Tests: Anterior Lachman test negative  Lateral Kd test negative  Right lower leg: No edema  Left lower leg: No edema  Legs:    Lymphadenopathy:      Cervical: No cervical adenopathy  Skin:     General: Skin is warm  Capillary Refill: Capillary refill takes less than 2 seconds  Findings: No bruising, erythema, lesion or rash  Neurological:      General: No focal deficit present  Mental Status: He is alert and oriented to person, place, and time  Cranial Nerves: No cranial nerve deficit  Sensory: No sensory deficit  Motor: No weakness        Coordination: Coordination normal    Psychiatric:         Mood and Affect: Mood normal          Fernando Thorpe MD  03/14/22  12:18 PM

## 2022-03-14 NOTE — ASSESSMENT & PLAN NOTE
-Will recommend NSAIDs scheduled for 7-14 days  -Continue ice therapy p r n   -Recommend patellar brace  -Follow-up with sports medicine and physical therapy

## 2022-03-15 ENCOUNTER — TELEPHONE (OUTPATIENT)
Dept: FAMILY MEDICINE CLINIC | Facility: CLINIC | Age: 27
End: 2022-03-15

## 2022-03-15 NOTE — TELEPHONE ENCOUNTER
PATENT CALLED REQUESTING A REFERRAL FOR SPORTS MEDICINE   THE REFERRAL THAT HE HAS ON HIS CHART IS      [facility]

## 2022-03-16 DIAGNOSIS — M76.50 PATELLAR TENDINITIS, UNSPECIFIED LATERALITY: Primary | ICD-10-CM

## 2022-03-21 ENCOUNTER — TELEPHONE (OUTPATIENT)
Dept: FAMILY MEDICINE CLINIC | Facility: CLINIC | Age: 27
End: 2022-03-21

## 2022-03-21 NOTE — TELEPHONE ENCOUNTER
Pt asked if he could get a letter for work he is still having a lot of back pain and can't get out of bed since last visit, pt hasn/t gone back to work,please advise

## 2022-03-23 ENCOUNTER — TELEPHONE (OUTPATIENT)
Dept: FAMILY MEDICINE CLINIC | Facility: CLINIC | Age: 27
End: 2022-03-23

## 2022-03-23 NOTE — TELEPHONE ENCOUNTER
Spoke with patient  He is still having some back ache  The antiinflammatory medication and muscle relaxant provides some relief  He has no lower extremity weakness  I recommend stretching exercises and follow up with sports medicine and physical therapy  He wants a letter for work as he plans to return tomorrow

## 2022-03-29 ENCOUNTER — APPOINTMENT (OUTPATIENT)
Dept: RADIOLOGY | Facility: MEDICAL CENTER | Age: 27
End: 2022-03-29
Payer: MEDICARE

## 2022-03-29 ENCOUNTER — OFFICE VISIT (OUTPATIENT)
Dept: OBGYN CLINIC | Facility: MEDICAL CENTER | Age: 27
End: 2022-03-29
Payer: MEDICARE

## 2022-03-29 VITALS
DIASTOLIC BLOOD PRESSURE: 71 MMHG | WEIGHT: 190 LBS | HEIGHT: 72 IN | SYSTOLIC BLOOD PRESSURE: 125 MMHG | HEART RATE: 75 BPM | BODY MASS INDEX: 25.73 KG/M2

## 2022-03-29 DIAGNOSIS — M25.562 CHRONIC PAIN OF LEFT KNEE: ICD-10-CM

## 2022-03-29 DIAGNOSIS — G89.29 CHRONIC PAIN OF LEFT KNEE: ICD-10-CM

## 2022-03-29 DIAGNOSIS — Z87.39 HISTORY OF OSGOOD-SCHLATTER DISEASE: ICD-10-CM

## 2022-03-29 DIAGNOSIS — S80.02XA CONTUSION OF LEFT KNEE, INITIAL ENCOUNTER: ICD-10-CM

## 2022-03-29 DIAGNOSIS — M54.50 ACUTE BILATERAL LOW BACK PAIN WITHOUT SCIATICA: Primary | ICD-10-CM

## 2022-03-29 DIAGNOSIS — M76.50 PATELLAR TENDINITIS, UNSPECIFIED LATERALITY: ICD-10-CM

## 2022-03-29 DIAGNOSIS — M54.50 ACUTE BILATERAL LOW BACK PAIN WITHOUT SCIATICA: ICD-10-CM

## 2022-03-29 PROCEDURE — 99214 OFFICE O/P EST MOD 30 MIN: CPT | Performed by: STUDENT IN AN ORGANIZED HEALTH CARE EDUCATION/TRAINING PROGRAM

## 2022-03-29 PROCEDURE — 72100 X-RAY EXAM L-S SPINE 2/3 VWS: CPT

## 2022-03-29 NOTE — PROGRESS NOTES
1  Acute bilateral low back pain without sciatica  XR spine lumbar 2 or 3 views injury    Ambulatory Referral to Physical Therapy   2  Patellar tendinitis, unspecified laterality  Ambulatory Referral to Sports Medicine    MRI knee left  wo contrast    Ambulatory Referral to Physical Therapy   3  Chronic pain of left knee  MRI knee left  wo contrast    Ambulatory Referral to Physical Therapy   4  Contusion of left knee, initial encounter  MRI knee left  wo contrast    Ambulatory Referral to Physical Therapy   5  History of Osgood-Schlatter disease  MRI knee left  wo contrast    Ambulatory Referral to Physical Therapy     Orders Placed This Encounter   Procedures    XR spine lumbar 2 or 3 views injury    MRI knee left  wo contrast    Ambulatory Referral to Physical Therapy        Imaging Studies (I personally reviewed images in PACS and report):     X-ray lumbar spine 03/29/2022:  No acute osseous abnormalities  No significant degenerative changes  Slight lumbar levoscoliosis  IMPRESSION:   History of slip and fall injury recently   Acute on chronic left knee pain - I treated this previously last year but patient states the pain never fully resolved and has acutely worsened since his recent injury   Acute midline and bilateral axial back pain without neurological symptoms  Date of Injury: 3/9/2022      PLAN:     Clinical exam and radiographic imaging reviewed with patient today, with impression as per above  I have discussed with the patient the pathophysiology of this diagnosis and reviewed how the examination correlates with this diagnosis   Imaging obtained of his lumbar spine today as noted above  I will follow-up official radiology interpretation I reviewed his prior imaging as well from the ER on 03/09/2022     Given the acute on chronic pain of his left knee that has been ongoing for several years and patient reportedly having no relief with formal PT, bracing, NSAIDs, acetaminophen, I have ordered an MRI of his left knee without contrast for further evaluation   In regards to further recovery from this injury, I have referred him to formal physical therapy at this time and also provided him home exercises   Regards to pain control, I counseled can continue p r n  Use of NSAIDs, acetaminophen, muscle relaxers, heat/ice therapy 20 minutes on/off as needed  Return for Follow up after MRI of left knee  Based on MRI results, I will determine either a steroid injection or discussing surgical intervention is warranted  Portions of the record may have been created with voice recognition software  Occasional wrong word or "sound a like" substitutions may have occurred due to the inherent limitations of voice recognition software  Read the chart carefully and recognize, using context, where substitutions have occurred  CHIEF COMPLAINT:  Chief Complaint   Patient presents with    Left Knee - Pain    Left Thigh - Pain         HPI:  Marcia Buckley is a 32 y o  male  who presents for       Visit 3/29/2022: Follow up left knee pain, initial evaluation of low back pain:  Of note, patient had been seen by me on 06/29/2021 in regards to chronic left knee pain and was referred to formal PT  Patient did attend a documented 2 sessions and states he was consistent with home exercises but did not feel it provided much relief for his knee pain  He recently had an injury on 03/09/2022 after slipping and falling onto his back and states he had an aggravation of his left knee as well  He states pain is over the anterior and medial aspects of his knee he reports intermittent clicking/popping  Describes as an aching/sharp pain that radiates up to his thigh at times  He states pain is aggravated with range of motion movements as well as prolonged walking  He states he continues to take acetaminophen/NSAIDs with limited relief but does not want to continue taking his medications    He states his PCP had offered a steroid injection but patient prefers to defer at this time and discuss today about it  Furthermore, he reports his low back pain has also worsened from his recent fall  He states pain is located in the midline and paralumbar aspects of his back and does not radiate  Describes this as an aching pain of moderate intensity it is worse with range of motion movements as well as forward flexion and lifting  He feels that his low back is stiff  He reports limited relief from acetaminophen/NSAIDs  He has no recent imaging was low back  He denies any numbness of his lower extremities, balance issues  Denies bowel/bladder incontinence  He states his pain does not wake him up at night but is not improved states his injury  Medical, Surgical, Family, and Social History    History reviewed  No pertinent past medical history  History reviewed  No pertinent surgical history  Social History   Social History     Substance and Sexual Activity   Alcohol Use Never     Social History     Substance and Sexual Activity   Drug Use Yes    Types: Marijuana    Comment: medical      Social History     Tobacco Use   Smoking Status Current Every Day Smoker    Packs/day: 0 50    Types: Cigarettes   Smokeless Tobacco Never Used     History reviewed  No pertinent family history  No Known Allergies       Physical Exam  /71   Pulse 75   Ht 6' (1 829 m)   Wt 86 2 kg (190 lb)   BMI 25 77 kg/m²     Constitutional:  see vital signs  Gen: well-developed, normocephalic/atraumatic, well-groomed  Eyes: No inflammation or discharge of conjunctiva or lids; sclera clear   Pharynx: no inflammation, lesion, or mass of lips  Neck: supple, no masses, non-distended  MSK: no inflammation, lesion, mass, or clubbing of nails and digits except for other than mentioned below  SKIN: no visible rashes or skin lesions  Pulmonary/Chest: Effort normal  No respiratory distress       Ortho Exam  BACK EXAM:  Gait: normal, no trendelenberg gait, no antalgic gait    BACK TENDERNESS:  Spinous Processes: +L3/4/5  Paraspinal Muscles: +b/l paralumbar  SI Joint: no  Sacrum: no    ROM:  Flexion: 70, aggravates lumbar  Extension: limited to 5-10, aggravated lumbar  Lateral flexion: 20 b/l, aggravates lumbar  Rotation: 20 b/l, aggravates lumbar    DERMATOMAL SENSATION:  L1: normal   L2: normal   L3: normal   L4: normal   L5: normal   S1: normal    STRENGTH (bilateral):  Knee Extension: 5/5  Knee Flexion: 5/5  Foot Dorsiflexion: 5/5  Great Toe Extension: 5/5  Foot Plantarflexion: 5/5  Hip Flexion: 5/5    REFLEXES:  Patellar: 2+ bilateral  Achilles: 2+ bilateral  Clonus: negative bilateral    BACK:   SUPINE STRAIGHT LEG: negative    HIP:  LOG ROLL: negative  GABE: negative  FADIR: negative    Left Knee Exam:  Erythema: no  Swelling: no  Increased Warmth: no  Tenderness: +infrapatellar aspect of knee along patellar tendon down to tibial tuberosity, MJL/LJL  ROM: 0-130  Patellar Grind: negative  Lachman's: negative  Varus laxity: negative  Valgus laxity: negative  Jong: + pain w/o clicking      LE NV Exam: +2 DP/PT pulses   Sensation intact to light touch      Left hip ROM demonstrates no pain actively or passively

## 2022-04-08 ENCOUNTER — HOSPITAL ENCOUNTER (OUTPATIENT)
Dept: MRI IMAGING | Facility: HOSPITAL | Age: 27
Discharge: HOME | End: 2022-04-08
Payer: MEDICARE

## 2022-04-08 DIAGNOSIS — M76.50 PATELLAR TENDINITIS, UNSPECIFIED LATERALITY: ICD-10-CM

## 2022-04-08 DIAGNOSIS — G89.29 CHRONIC PAIN OF LEFT KNEE: ICD-10-CM

## 2022-04-08 DIAGNOSIS — M25.562 CHRONIC PAIN OF LEFT KNEE: ICD-10-CM

## 2022-04-08 DIAGNOSIS — Z87.39 HISTORY OF OSGOOD-SCHLATTER DISEASE: ICD-10-CM

## 2022-04-08 DIAGNOSIS — S80.02XA CONTUSION OF LEFT KNEE, INITIAL ENCOUNTER: ICD-10-CM

## 2022-04-08 PROCEDURE — G1004 CDSM NDSC: HCPCS

## 2022-04-08 PROCEDURE — 73721 MRI JNT OF LWR EXTRE W/O DYE: CPT

## 2023-02-23 ENCOUNTER — TELEPHONE (OUTPATIENT)
Dept: OTHER | Facility: OTHER | Age: 28
End: 2023-02-23

## 2023-02-23 NOTE — TELEPHONE ENCOUNTER
Patient is calling regarding cancelling an appointment with Gisele Lou     Date/Time: 02/23 @ 08:20     Patient was rescheduled: YES [x] NO []  03/01 @1:00  Patient requesting call back to reschedule: YES [] NO [x]

## 2023-03-06 ENCOUNTER — HOSPITAL ENCOUNTER (EMERGENCY)
Facility: HOSPITAL | Age: 28
Discharge: HOME | End: 2023-03-06
Attending: EMERGENCY MEDICINE

## 2023-03-06 VITALS
WEIGHT: 172.1 LBS | SYSTOLIC BLOOD PRESSURE: 128 MMHG | RESPIRATION RATE: 16 BRPM | DIASTOLIC BLOOD PRESSURE: 69 MMHG | OXYGEN SATURATION: 100 % | BODY MASS INDEX: 23.31 KG/M2 | HEIGHT: 72 IN | TEMPERATURE: 99.3 F | HEART RATE: 88 BPM

## 2023-03-06 DIAGNOSIS — G89.29 CHRONIC BACK PAIN: ICD-10-CM

## 2023-03-06 DIAGNOSIS — F32.A DEPRESSION: Primary | ICD-10-CM

## 2023-03-06 DIAGNOSIS — M54.9 CHRONIC BACK PAIN: ICD-10-CM

## 2023-03-06 LAB
AMPHETAMINES SERPL QL SCN: NEGATIVE
BARBITURATES UR QL: NEGATIVE
BENZODIAZ UR QL: NEGATIVE
COCAINE UR QL: NEGATIVE
ETHANOL EXG-MCNC: 0 MG/DL
FLUAV RNA RESP QL NAA+PROBE: NEGATIVE
FLUBV RNA RESP QL NAA+PROBE: NEGATIVE
METHADONE UR QL: NEGATIVE
OPIATES UR QL SCN: NEGATIVE
OXYCODONE+OXYMORPHONE UR QL SCN: NEGATIVE
PCP UR QL: NEGATIVE
RSV RNA RESP QL NAA+PROBE: NEGATIVE
SARS-COV-2 RNA RESP QL NAA+PROBE: NEGATIVE
THC UR QL: POSITIVE

## 2023-03-06 NOTE — ED NOTES
Patient is a 32 yr old male, reports that he has a hz of depression, and also reports that he has a gambling addiction and would like resources  No SI', Hi's or psychosis reported  Discussed an old legal charge that interfers with getting jobs, but he also starts jobs and is unable to continue  Patient is asking for outpt resources      no mental health resources

## 2023-03-06 NOTE — ED NOTES
This writer discussed the patients current presentation and recommended discharge plan with Dr Giulia Perera    The patient was provided with referral information for:   Gambling addiction information  [facility]        This writer discussed discharge plans with the patient , who agrees with and understands the discharge plans               National Suicide Prevention Hotline:  [address] [phone] - [facility] Crisis/Mobile Crisis   [address]: [phone]  [place]: [address] [phone] - Crisis   [phone] - Peer Support Talk Line (1-9pm daily)  [phone] - Teen Support Talk Line (1-9pm daily)  [address] [place] [address] ([place]) [phone] - [address]

## 2023-03-06 NOTE — ED PROVIDER NOTES
History  Chief Complaint   Patient presents with   • Depression     States has depressions; denies SI, HI, AH, VH; wanting to possibly get some help for that-has been losing weight and having body aches over the past year; lower back pains- chronic pains being treated by PCP; would like another referral for spine specialist      Patient is a 26-year-old male accompanied by mom with progressively worsening depression  States ongoing for the last year  No Si/Hi/hallucinations  States reached out for help from the office, but none provided  No previous dx  No meds  No therapy  Cites issues with work, race, and recent breakup as stressors  Denies any etoh  Does admit to marijuana  Does have his medical card  Prior to Admission Medications   Prescriptions Last Dose Informant Patient Reported? Taking?   lidocaine (Lidoderm) 5 %   No No   Sig: Apply 1 patch topically daily Remove & Discard patch within 12 hours or as directed by MD   methocarbamol (ROBAXIN) 500 mg tablet   No No   Sig: Take 1 tablet (500 mg total) by mouth 4 (four) times a day for 14 days   naproxen (Naprosyn) 500 mg tablet   No No   Sig: Take 1 tablet (500 mg total) by mouth 2 (two) times a day with meals      Facility-Administered Medications: None       History reviewed  No pertinent past medical history  History reviewed  No pertinent surgical history  History reviewed  No pertinent family history  I have reviewed and agree with the history as documented      E-Cigarette/Vaping   • E-Cigarette Use Never User      E-Cigarette/Vaping Substances   • Nicotine No    • THC No    • CBD No    • Flavoring No    • Other No    • Unknown No      Social History     Tobacco Use   • Smoking status: Every Day     Packs/day: 0 50     Types: Cigarettes   • Smokeless tobacco: Never   Vaping Use   • Vaping Use: Never used   Substance Use Topics   • Alcohol use: Never   • Drug use: Yes     Types: Marijuana     Comment: medical        Review of Systems   Constitutional: Negative  HENT: Negative  Eyes: Negative  Respiratory: Negative  Cardiovascular: Negative  Gastrointestinal: Negative  Endocrine: Negative  Genitourinary: Negative  Musculoskeletal: Negative  Skin: Negative  Allergic/Immunologic: Negative  Neurological: Negative  Hematological: Negative  Psychiatric/Behavioral: Negative  All other systems reviewed and are negative  Physical Exam  Physical Exam  Vitals reviewed  Constitutional:       Appearance: Normal appearance  He is normal weight  Cardiovascular:      Rate and Rhythm: Normal rate and regular rhythm  Pulses: Normal pulses  Heart sounds: Normal heart sounds  Musculoskeletal:         General: Normal range of motion  Cervical back: Normal range of motion and neck supple  Skin:     General: Skin is warm and dry  Capillary Refill: Capillary refill takes less than 2 seconds  Neurological:      General: No focal deficit present  Mental Status: He is alert and oriented to person, place, and time  Psychiatric:         Attention and Perception: Attention and perception normal          Mood and Affect: Mood is depressed  Speech: Speech normal          Behavior: Behavior normal  Behavior is cooperative  Thought Content:  Thought content normal          Cognition and Memory: Cognition and memory normal          Judgment: Judgment normal          Vital Signs  ED Triage Vitals [03/06/23 1331]   Temperature Pulse Respirations Blood Pressure SpO2   99 3 °F (37 4 °C) 88 16 128/69 100 %      Temp Source Heart Rate Source Patient Position - Orthostatic VS BP Location FiO2 (%)   Oral Monitor Sitting Left arm --      Pain Score       7           Vitals:    03/06/23 1331   BP: 128/69   Pulse: 88   Patient Position - Orthostatic VS: Sitting         Visual Acuity      ED Medications  Medications - No data to display    Diagnostic Studies  Results Reviewed Procedure Component Value Units Date/Time    FLU/RSV/COVID - if FLU/RSV clinically relevant [[id number]]  (Normal) Collected: 03/06/23 1545    Lab Status: Final result Specimen: Nares from Nose Updated: 03/06/23 1632     SARS-CoV-2 Negative     INFLUENZA A PCR Negative     INFLUENZA B PCR Negative     RSV PCR Negative    Narrative:      FOR PEDIATRIC PATIENTS - copy/paste COVID Guidelines URL to browser: [email or web address]  Let    SARS-CoV-2 assay is a Nucleic Acid Amplification assay intended for the  qualitative detection of nucleic acid from SARS-CoV-2 in nasopharyngeal  swabs  Results are for the presumptive identification of SARS-CoV-2 RNA  Positive results are indicative of infection with SARS-CoV-2, the virus  causing COVID-19, but do not rule out bacterial infection or co-infection  with other viruses  Laboratories within the [place] and its  territories are required to report all positive results to the appropriate  public health authorities  Negative results do not preclude SARS-CoV-2  infection and should not be used as the sole basis for treatment or other  patient management decisions  Negative results must be combined with  clinical observations, patient history, and epidemiological information  This test has not been FDA cleared or approved  This test has been authorized by FDA under an Emergency Use Authorization  (EUA)  This test is only authorized for the duration of time the  declaration that circumstances exist justifying the authorization of the  emergency use of an in vitro diagnostic tests for detection of SARS-CoV-2  virus and/or diagnosis of COVID-19 infection under section 564(b)(1) of  the Act, 21 U  S C  635KLD-1(X)(7), unless the authorization is terminated  or revoked sooner  The test has been validated but independent review by FDA  and CLIA is pending  Test performed using [employer]:  This RT-PCR assay targets N2,  a region unique to SARS-CoV-2  A conserved region in the E-gene was chosen  for pan-Sarbecovirus detection which includes SARS-CoV-2  According to CMS-2020-01-R, this platform meets the definition of high-throughput technology  Rapid drug screen, urine [[id number]]  (Abnormal) Collected: 03/06/23 1544    Lab Status: Final result Specimen: Urine, Clean Catch Updated: 03/06/23 1617     Amph/Meth UR Negative     Barbiturate Ur Negative     Benzodiazepine Urine Negative     Cocaine Urine Negative     Methadone Urine Negative     Opiate Urine Negative     PCP Ur Negative     THC Urine Positive     Oxycodone Urine Negative    Narrative:      Presumptive report  If requested, specimen will be sent to reference lab for confirmation  FOR MEDICAL PURPOSES ONLY  IF CONFIRMATION NEEDED PLEASE CONTACT THE LAB WITHIN 5 DAYS  Drug Screen Cutoff Levels:  AMPHETAMINE/METHAMPHETAMINES  1000 ng/mL  BARBITURATES     200 ng/mL  BENZODIAZEPINES     200 ng/mL  COCAINE      300 ng/mL  METHADONE      300 ng/mL  OPIATES      300 ng/mL  PHENCYCLIDINE     25 ng/mL  THC       50 ng/mL  OXYCODONE      100 ng/mL    POCT alcohol breath test [[id number]]  (Normal) Resulted: 03/06/23 1543    Lab Status: Final result Updated: 03/06/23 1543     EXTBreath Alcohol 0 000                 No orders to display              Procedures  Procedures         ED Course  ED Course as of 03/06/23 2311   Mon Mar 06, 2023   1709 Seen by JESUS Tracy, no criteria for admission  Given outpt  Resources  Okay with dc  Medical Decision Making  Chronic back pain: chronic illness or injury     Details: given comp spine referral as requested  Depression: acute illness or injury     Details: no Si/Hi/hallucinations  no previous treatment  can start as outpatient  Amount and/or Complexity of Data Reviewed  Labs: ordered  Decision-making details documented in ED Course            Disposition  Final diagnoses: Depression   Chronic back pain     Time reflects when diagnosis was documented in both MDM as applicable and the Disposition within this note     Time User Action Codes Description Comment    3/6/2023  3:36 PM Karen Comings Add [F32  A] Depression     3/6/2023  5:10 PM Karen Comings Add [M54 9,  G89 29] Chronic back pain       ED Disposition     ED Disposition   Discharge    Condition   Stable    Date/Time   Mon Mar 6, 2023  5:09 PM    Comment   Purvi Castle should be transferred out to [facility] and has been medically cleared  MD Documentation    Soumya Parnell Most Recent Value   Sending MD Dr Lalo Abdalla up With Specialties Details Why [facility], [address] [address] [place] [address]  [phone]          Please follow up with the resources provided            Discharge Medication List as of 3/6/2023  5:10 PM      CONTINUE these medications which have NOT CHANGED    Details   lidocaine (Lidoderm) 5 % Apply 1 patch topically daily Remove & Discard patch within 12 hours or as directed by MD, Starting Mon 3/14/2022, Normal      methocarbamol (ROBAXIN) 500 mg tablet Take 1 tablet (500 mg total) by mouth 4 (four) times a day for 14 days, Starting Mon 3/14/2022, Until Tue 3/29/2022, Normal      naproxen (Naprosyn) 500 mg tablet Take 1 tablet (500 mg total) by mouth 2 (two) times a day with meals, Starting Mon 3/14/2022, Until Wed 4/13/2022, Normal                 PDMP Review     None          ED Provider  Electronically Signed by           Sommer De La Torre MD  03/06/23 1985

## 2023-03-07 ENCOUNTER — TELEPHONE (OUTPATIENT)
Dept: PHYSICAL THERAPY | Facility: OTHER | Age: 28
End: 2023-03-07

## 2023-03-17 NOTE — TELEPHONE ENCOUNTER
Call placed to the patient per [facility] referral      Unable to leave a message  "MAILBOX IS FULL"     This is the 3rd attempt to reach the patient  Will close csp referral per protocol

## 2023-09-13 ENCOUNTER — TELEPHONE (OUTPATIENT)
Dept: PSYCHIATRY | Facility: CLINIC | Age: 28
End: 2023-09-13

## 2023-09-13 NOTE — LETTER
Dear Collette Bertin : We are contacting you because your name is currently included on the [address] [place] [address] wait-list for Talk Therapy and/or Medication Management. (Please [place] which services are needed)     In our efforts to provide the highest quality care, [facility] has begun the process of upgrading our behavioral health systems to increase efficiency and expedite delivery of services. As part of this process, we ask you to please confirm your continued interest in the services above. If you are no longer interested or in need, please avril “No” in the area below. If you are still interested and in need, please avril “Yes” and provide your most current demographic and insurance information within 15 days. If we do not receive confirmation from you by 2023 your information will not be included in the system upgrade and your place on the waitlist will be lost.     Thank you in advance for your patience and understanding and we apologize for any inconvenience this may cause. Patient Name and :    Still in need of services: Yes or No     Current Address:     Phone#:     Best time to receive a call: Insurance Carrier:      Policy/ID#: Group#: Insurance Services Phone#:      What is your current presenting problem? Open to virtual talk therapy: Yes or No      We will call you to do an Intake when an appointment becomes available. You can send this information back to us in any of the ways below:    Email: [email or web address] [email or web address] Mcmahon  Fax#:  [phone]  Mail:   [address] [place] [address], [address] [place] [address]

## 2023-09-28 ENCOUNTER — OFFICE VISIT (OUTPATIENT)
Dept: FAMILY MEDICINE CLINIC | Facility: CLINIC | Age: 28
End: 2023-09-28

## 2023-09-28 VITALS
WEIGHT: 172.2 LBS | BODY MASS INDEX: 23.32 KG/M2 | HEART RATE: 82 BPM | OXYGEN SATURATION: 98 % | DIASTOLIC BLOOD PRESSURE: 62 MMHG | HEIGHT: 72 IN | SYSTOLIC BLOOD PRESSURE: 116 MMHG | RESPIRATION RATE: 17 BRPM | TEMPERATURE: 98.7 F

## 2023-09-28 DIAGNOSIS — M25.50 POLYARTHRALGIA: ICD-10-CM

## 2023-09-28 DIAGNOSIS — Z13.0 SCREENING, ANEMIA, DEFICIENCY, IRON: ICD-10-CM

## 2023-09-28 DIAGNOSIS — Z28.21 COVID-19 VACCINATION DECLINED: ICD-10-CM

## 2023-09-28 DIAGNOSIS — Z11.3 ROUTINE SCREENING FOR STI (SEXUALLY TRANSMITTED INFECTION): ICD-10-CM

## 2023-09-28 DIAGNOSIS — Z11.4 SCREENING FOR HIV (HUMAN IMMUNODEFICIENCY VIRUS): ICD-10-CM

## 2023-09-28 DIAGNOSIS — Z13.220 SCREENING CHOLESTEROL LEVEL: ICD-10-CM

## 2023-09-28 DIAGNOSIS — Z13.1 SCREENING FOR DIABETES MELLITUS: ICD-10-CM

## 2023-09-28 DIAGNOSIS — Z11.59 ENCOUNTER FOR HEPATITIS C SCREENING TEST FOR LOW RISK PATIENT: ICD-10-CM

## 2023-09-28 DIAGNOSIS — M92.522 OSGOOD-SCHLATTER'S DISEASE OF LEFT LOWER EXTREMITY: Primary | ICD-10-CM

## 2023-09-28 DIAGNOSIS — F32.A DEPRESSION, UNSPECIFIED DEPRESSION TYPE: ICD-10-CM

## 2023-09-28 DIAGNOSIS — Z28.21 INFLUENZA VACCINATION DECLINED: ICD-10-CM

## 2023-09-28 PROCEDURE — 99214 OFFICE O/P EST MOD 30 MIN: CPT | Performed by: NURSE PRACTITIONER

## 2023-09-28 RX ORDER — SERTRALINE HYDROCHLORIDE 25 MG/1
25 TABLET, FILM COATED ORAL DAILY
Qty: 30 TABLET | Refills: 1 | Status: SHIPPED | OUTPATIENT
Start: 2023-09-28 | End: 2024-03-26

## 2023-09-28 NOTE — TELEPHONE ENCOUNTER
[place] List Letter    Patient Name and : Mariaa Santacruz    Still in need of services: Yes      Current Address: [address] [place]   Merlin Keller [address] [place] [address]    Phone#: [phone]    Best time to receive a call: any time    Insurance Carrier: Giuliana Amezquita     Policy/ID#: [id number]    Group#: Insurance Services Phone#:     What is your current presenting problem? Patient is having some personal issues        You can send this information back to us in any of the ways below:    Email: [email or web address]  Fax#:  [phone]  Mail:   [address]

## 2023-09-28 NOTE — PATIENT INSTRUCTIONS
Sertraline (By mouth)   Sertraline (SER-tra-yonas)  Treats depression, obsessive-compulsive disorder (OCD), posttraumatic stress disorder (PTSD), premenstrual dysphoric disorder (PMDD), social anxiety disorder, and panic disorder. This medicine is an SSRI. Brand Name(s): Zoloft   There may be other brand names for this medicine. When This Medicine Should Not Be Used: This medicine is not right for everyone. Do not use it if you had an allergic reaction to sertraline. How to Use This Medicine:   Liquid, Tablet  Take your medicine as directed. Your dose may need to be changed several times to find what works best for you. You may need to take it for a few weeks or months before you feel better. Oral liquid: Use the dropper provided to remove the medicine and mix it with 1/2 cup (4 ounces) of water, ginger ale, lemon-lime soda, lemonade, or orange juice. Drink the mixture right away. It is normal for it to look a bit hazy. This medicine should come with a Medication Guide. Ask your pharmacist for a copy if you do not have one. Missed dose: Take a dose as soon as you remember. If it is almost time for your next dose, wait until then and take a regular dose. Do not take extra medicine to make up for a missed dose. Store the medicine in a closed container at room temperature, away from heat, moisture, and direct light. Drugs and Foods to Avoid:   Ask your doctor or pharmacist before using any other medicine, including over-the-counter medicines, vitamins, and herbal products. Do not use this medicine together with pimozide. Do not use this medicine and an MAO inhibitor (MAOI) within 14 days of each other. Do not use the oral liquid form of sertraline if you are also using disulfiram.  Some medicines can affect how sertraline works.  Tell your doctor if you are using the following:   Atomoxetine, buspirone, cimetidine, cisapride, desipramine, dextromethorphan, fentanyl, flecainide, lithium, methadone, metoprolol, nebivolol, perphenazine, phenytoin, propafenone, Anders's wort, tacrolimus, thoridazine, tolterodine, tramadol, venlafaxine  Blood thinner (including warfarin)  Diuretic (water pill)  Medicine to treat infection (including erythromycin, gatifloxacin, moxifloxacin)  NSAID pain or arthritis medicine (including aspirin, celecoxib, diclofenac, ibuprofen, naproxen)  Triptan medicine for migraine headaches  Tryptophan supplements  Do not drink alcohol while you are using this medicine. Warnings While Using This Medicine:   Tell your doctor if you are pregnant or breastfeeding, or if you have liver disease, bleeding problems, glaucoma, heart disease, heart rhythm problems, or a history of claudia, seizure disorder, or stroke. For some children, teenagers, and young adults, this medicine may increase mental or emotional problems. This may lead to thoughts of suicide and violence. Talk with your doctor right away if you have any thoughts or behavior changes that concern you. Tell your doctor if you or anyone in your family has a history of bipolar disorder or suicide attempts. This medicine may cause the following problems:   Serotonin syndrome (may be life-threatening when used with certain other medicines)  Increased risk of bleeding problems  Heart rhythm problems, including QT prolongation, torsades de pointes  Sexual problems  Tell your doctor if you are sensitive to latex, because the oral liquid comes with a latex rubber dropper. This medicine may make you dizzy or drowsy. Do not drive or do anything that could be dangerous until you know how this medicine affects you. Do not stop using this medicine suddenly. Your doctor will need to slowly decrease your dose before you stop it completely. Tell any doctor or dentist who treats you that you are using this medicine. This medicine may affect certain medical test results. Your doctor will do lab tests at regular visits to check on the effects of this medicine.  Keep all appointments. Keep all medicine out of the reach of children. Never share your medicine with anyone. Possible Side Effects While Using This Medicine:   Call your doctor right away if you notice any of these side effects: Allergic reaction: Itching or hives, swelling in your face or hands, swelling or tingling in your mouth or throat, chest tightness, trouble breathing  Anxiety, restlessness, fast heartbeat, fever, sweating, muscle spasms, twitching, nausea, vomiting, diarrhea, seeing or hearing things that are not there  Blistering, peeling, or red skin rash  Confusion, weakness, and muscle twitching  Eye pain, vision changes, seeing halos around lights  Feeling more excited or energetic than usual  Loss in sexual ability, desire, drive, or performance, delayed or inability to have an orgasm, inability to have or keep an erection  Thoughts of hurting yourself or others, unusual mood or behavior  Unusual bleeding or bruising  If you notice these less serious side effects, talk with your doctor:   Constipation, mild diarrhea, loss of appetite, weight loss  Dry mouth  Nausea, vomiting  Trouble sleeping  If you notice other side effects that you think are caused by this medicine, tell your doctor. Call your doctor for medical advice about side effects. You may report side effects to FDA at 6-687-FDA-5419  © Copyright Kelly Gabriel 2023 Information is for End User's use only and may not be sold, redistributed or otherwise used for commercial purposes. The above information is an  only. It is not intended as medical advice for individual conditions or treatments. Talk to your doctor, nurse or pharmacist before following any medical regimen to see if it is safe and effective for you. Depression   WHAT YOU NEED TO KNOW:   Depression is a mood disorder that causes feelings of sadness or hopelessness that do not go away. Depression may cause you to lose interest in things you used to enjoy.  These feelings may interfere with your daily life. DISCHARGE INSTRUCTIONS:   Call your local emergency number (911 in the [address]) if:   You think about hurting yourself or someone else. You have done something on purpose to hurt yourself. Call your therapist or doctor if:   Your symptoms get worse or do not get better with treatment. Your depression keeps you from doing your regular daily activities. You have new symptoms since your last visit. You have questions or concerns about your condition or care. The following resources are available at any time to help you, if needed:   Contact a suicide prevention organization: For the [employer] Suicide and Crisis Lifeline:     Call or text 41420 [phone] 94 73 a chat on https://appsplit.org/chat     Call 0-228.483.4104 (5-871-884-TALK)    For the Suicide Hotline, call 6-818.897.1092 (8-799-VSHWMWF)    For a list of international numbers: https://save.org/find-help/international-resources/  Medicines:   Antidepressants  may be given to decrease or manage symptoms. You may need to take this medicine for several weeks before they start working. Take your medicine as directed. Contact your healthcare provider if you think your medicine is not helping or if you have side effects. Tell your provider if you are allergic to any medicine. Keep a list of the medicines, vitamins, and herbs you take. Include the amounts, and when and why you take them. Bring the list or the pill bottles to follow-up visits. Carry your medicine list with you in case of an emergency. Cognitive behavioral therapy (CBT)  teaches you how to identify and change negative thought patterns. Self-care:   Talk to someone about your depression. Your healthcare provider may suggest counseling. You might feel more comfortable talking with a friend or family member about your depression. Choose someone you know will be supportive and encouraging. Get regular physical activity.   Try to be active for 30 minutes, 3 to 5 days a week. Or, break exercise into 10 minute periods, 3 times a day. Physical activity can lower your stress, improve your mood, and help you sleep better. Work with your healthcare provider to develop a plan that you enjoy. Create a regular sleep schedule. A routine can help you relax before bed. Listen to music, read, or do yoga. Try to go to bed and wake up at the same time every day. Sleep is important for emotional health. Eat a variety of healthy foods. Healthy foods include fruits, vegetables, whole-grain breads, low-fat dairy products, lean meats, fish, and cooked beans. A healthy meal plan is low in fat, salt, and added sugar. Do not use alcohol, drugs, or nicotine products. These can worsen depression or make it hard to manage. Talk to your therapist or healthcare provider if you use any of these products and need help to quit. Follow up with your therapist or doctor as directed: Your healthcare provider will monitor your progress at follow-up visits. Your provider will also monitor your medicine if you take antidepressants and ask if the medicine is helping. Tell your provider about any side effects or problems you have with your medicine. The type or amount of medicine may need to be changed. Write down your questions so you remember to ask them during your visits. For more information or support:   [employer] [facility]ness  [address] [facility] , [address] [place] [address] P[address]  Phone: [phone]  Phone: [phone]  Web Address: http://www.[employer]/. org  [address] [place] [address] Suicide and [address] [place] [phone]  Phone: [phone]  Web Address: [employer].OneRoof. org OR https://[employer].org/chat/    © Copyright [employer] 2023 Information is for End User's use only and may not be sold, redistributed or otherwise used for commercial purposes. The above information is an  only.  It is not intended as medical advice for individual conditions or treatments. Talk to your doctor, nurse or pharmacist before following any medical regimen to see if it is safe and effective for you.

## 2023-09-28 NOTE — ASSESSMENT & PLAN NOTE
Had an extensive discussion about whether to wait for psychiatry to implement medication or to start now. As the patient is concerned that seeing the psychiatrist make take time, he would like to start an antidepressant today. He is aware than on evaluation by psychiatry his diagnosis as well as treatment plan may change. Side effects including when to seek emergency care were discussed. Denies any suicidal thoughts or plan currently or in the past.   Contact information for psychiatry given and patient will call them today to schedule an appointment.

## 2023-09-28 NOTE — ASSESSMENT & PLAN NOTE
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    Requesting referral to ortho/sports medicine

## 2023-09-28 NOTE — PROGRESS NOTES
Name: Dexter Anaya      : 1995      MRN: [id number]  Encounter Provider: JESSICA Farias  Encounter Date: 2023   Encounter department: [address] [facility],[address]     1. Osgood-Schlatter's disease of left lower extremity  Assessment & Plan:  Patient reports being diagnosed with this disorder as a child and has had physical therapy multiple times with minimal improvement. Requesting referral to ortho/sports medicine    Orders:  -     Ambulatory Referral to Sports Medicine; Future    2. Depression, unspecified depression type  Assessment & Plan:  Had an extensive discussion about whether to wait for psychiatry to implement medication or to start now. As the patient is concerned that seeing the psychiatrist make take time, he would like to start an antidepressant today. He is aware than on evaluation by psychiatry his diagnosis as well as treatment plan may change. Side effects including when to seek emergency care were discussed. Denies any suicidal thoughts or plan currently or in the past.   Contact information for psychiatry given and patient will call them today to schedule an appointment. Orders:  -     sertraline (ZOLOFT) 25 mg tablet; Take 1 tablet (25 mg total) by mouth daily  -     Ambulatory referral to [employer]; Future    3. Routine screening for STI (sexually transmitted infection)  -     RPR-Syphilis Screening (Total Syphilis IGG/IGM); Future  -     Chlamydia/GC amplified DNA by PCR; Future    4. Polyarthralgia  -     Vitamin D 25 hydroxy; Future    5. Screening for diabetes mellitus  -     Comprehensive metabolic panel; Future    6. Screening, anemia, deficiency, iron  -     CBC and differential; Future    7. Screening cholesterol level  -     Lipid panel; Future    8. Screening for HIV (human immunodeficiency virus)  -     HIV 1/2 AB/AG w Reflex [facility] for 2 yr old and above; Future    9.  Encounter for hepatitis C screening test for low risk patient  -     Hepatitis C antibody; Future    10. Influenza vaccination declined    11. COVID-19 vaccination declined         Subjective     Wanda Banegas is a 32year old male who is being seen in the office today for depressed mood. He reports "always feeling a little low since I was a child". However, symptoms have worsened in the past 4 years since release from incarceration and struggle to find employment. He states he has never been seen by a mental health professional in the past except for anger management courses during his incarceration. He has not been on psychiatric medications in the past.   When asked if he followed up on the psychiatric wait list letter that was mailed to him, he states he never received the wait list letter from psychiatry. Subjective      Drew Cowden is an 32 y.o. male who presents for evaluation and treatment of depressive symptoms. Onset approximately 4 years ago, gradually worsening since that time. Current symptoms include depressed mood, anhedonia, insomnia, hopelessness, weight loss and decreased appetite. Current treatment for depression:None  Sleep problems: Marked    Early awakening: Moderate    Energy: Good  Motivation: Good  Concentration: Fair  Rumination/worrying: Moderate  Memory: Excellent  Tearfulness: Mild   Anxiety: Absent   Panic: Absent   Overall Mood: No change   Hopelessness: Marked  Suicidal ideation: Absent   Other/Psychosocial Stressors: economic instability including difficulty obtaining employment. Family history positive for depression in the patient's mother. Previous treatment modalities employed include None. Past episodes of depression:seen in ED March 2023  Organic causes of depression present: None. Review of Systems  Constitutional: Positive for appetite change and unexpected weight change. HENT: Negative. Eyes: Negative. Respiratory: Negative. Cardiovascular: Negative. Gastrointestinal: Negative. Negative for vomiting. Genitourinary: Negative. Musculoskeletal: Positive for arthralgias. Skin: Negative for color change and rash. Neurological: Negative. Psychiatric/Behavioral: Positive for dysphoric mood and sleep disturbance. Negative for confusion, decreased concentration, self-injury and suicidal ideas. All other systems reviewed and are negative. Objective     Mental Status Examination:  Posture and motor behavior: Appropriate  Dress, grooming, personal hygiene: Appropriate  Facial expression: Appropriate  Speech: Appropriate  Mood: Depressed  Coherency and relevance of thought: Appropriate  Thought content: Appropriate  Perceptions: Appropriate  Orientation:Appropriate  Attention and concentration: Appropriate  Memory: : Appropriate  Information: Not examined  Vocabulary: Appropriate  Abstract reasoning: Appropriate  Judgment: Appropriate    Assessment/Plan     Experiencing the following symptoms of depression most of the day nearly every day for more than two consecutive weeks: change in appetite or weight, change in sleep, depressed mood, loss of interests/pleasure    Depressive Disorder: depression  Patient's MDD is not in remission. Suicide Risk Assessment: Patient assessed for risk of suicide. Suicidal intent: denies  Suicidal plan: denies  Access to means for suicide: denies  Lethality of means for suicide: denies  Prior suicide attempts: denies  Recent exposure to suicide:denies    1. Osgood-Schlatter's disease of left lower extremity  Ambulatory Referral to Sports Medicine      2. Depression, unspecified depression type  sertraline (ZOLOFT) 25 mg tablet    Ambulatory referral to Psych Services      3. Routine screening for STI (sexually transmitted infection)  RPR-Syphilis Screening (Total Syphilis IGG/IGM)    Chlamydia/GC amplified DNA by PCR      4. Polyarthralgia  Vitamin D 25 hydroxy      5.  Screening for diabetes mellitus  Comprehensive metabolic panel      6. Screening, anemia, deficiency, iron  CBC and differential      7. Screening cholesterol level  Lipid panel      8. Screening for HIV (human immunodeficiency virus)  HIV 1/2 AB/AG w Reflex [facility] for 2 yr old and above      9. Encounter for hepatitis C screening test for low risk patient  Hepatitis C antibody      10. Influenza vaccination declined        11. COVID-19 vaccination declined            Reviewed concept of depression as biochemical imbalance of neurotransmitters and rationale for treatment. Instructed patient to contact office or on-call physician promptly should condition worsen or any new symptoms appear and provided on-call telephone numbers. No past medical history on file. No past surgical history on file. No family history on file.   Social History     Socioeconomic History   • Marital status: Single     Spouse name: None   • Number of children: None   • Years of education: None   • Highest education level: None   Occupational History   • None   Tobacco Use   • Smoking status: Every Day     Packs/day: 0.50     Types: Cigarettes   • Smokeless tobacco: Never   Vaping Use   • Vaping Use: Never used   Substance and Sexual Activity   • Alcohol use: Never   • Drug use: Yes     Types: Marijuana     Comment: medical    • Sexual activity: None   Other Topics Concern   • None   Social History Narrative   • None     Social Determinants of Health     Financial Resource Strain: Not on file   Food Insecurity: Not on file   Transportation Needs: Not on file   Physical Activity: Not on file   Stress: Not on file   Social Connections: Not on file   Intimate Partner Violence: Not on file   Housing Stability: Not on file     Current Outpatient Medications on File Prior to Visit   Medication Sig   • lidocaine (Lidoderm) 5 % Apply 1 patch topically daily Remove & Discard patch within 12 hours or as directed by MD   • methocarbamol (ROBAXIN) 500 mg tablet Take 1 tablet (500 mg total) by mouth 4 (four) times a day for 14 days   • [DISCONTINUED] naproxen (Naprosyn) 500 mg tablet Take 1 tablet (500 mg total) by mouth 2 (two) times a day with meals     No Known Allergies  Immunization History   Administered Date(s) Administered   • DTaP 02/01/1996, 04/03/1996, 06/06/1996, 03/06/1997, 01/08/2001   • HPV Quadrivalent 01/26/2012, 05/21/2014   • Hep B, Adolescent or Pediatric 1995, 02/01/1996, 06/06/1996   • Hepatitis A 01/29/2009   • HiB 02/01/1996, 04/03/1996, 06/06/1996, 03/06/1997   • IPV 02/01/1996, 04/03/1996, 06/06/1996   • MMR 12/05/1996, 01/08/2001   • Meningococcal MCV4P 01/29/2009, 05/21/2014   • Meningococcal, Unknown Serogroups 05/21/2014   • Td (adult), adsorbed 07/14/2007   • Tdap 01/29/2009   • Varicella 03/05/1998, 01/29/2009       Objective     /62 (BP Location: Left arm, Patient Position: Sitting, Cuff Size: Standard)   Pulse 82   Temp 98.7 °F (37.1 °C) (Temporal)   Resp 17   Ht 6' (1.829 m)   Wt 78.1 kg (172 lb 3.2 oz)   SpO2 98%   BMI 23.35 kg/m²     Physical Exam  Constitutional:       General: He is not in acute distress. Appearance: Normal appearance. Cardiovascular:      Rate and Rhythm: Normal rate and regular rhythm. Heart sounds: Normal heart sounds. Pulmonary:      Effort: Pulmonary effort is normal.      Breath sounds: Normal breath sounds. Abdominal:      General: Bowel sounds are normal.   Musculoskeletal:         General: Tenderness present. No swelling. Skin:     General: Skin is warm and dry. Neurological:      Mental Status: He is alert.        Noah Turpin

## 2024-03-04 ENCOUNTER — OFFICE VISIT (OUTPATIENT)
Dept: FAMILY MEDICINE CLINIC | Facility: CLINIC | Age: 29
End: 2024-03-04

## 2024-03-04 VITALS
SYSTOLIC BLOOD PRESSURE: 100 MMHG | WEIGHT: 171.8 LBS | RESPIRATION RATE: 16 BRPM | HEART RATE: 77 BPM | OXYGEN SATURATION: 97 % | DIASTOLIC BLOOD PRESSURE: 60 MMHG | BODY MASS INDEX: 22.77 KG/M2 | HEIGHT: 73 IN | TEMPERATURE: 97.5 F

## 2024-03-04 DIAGNOSIS — G89.29 CHRONIC PAIN OF LEFT KNEE: Primary | ICD-10-CM

## 2024-03-04 DIAGNOSIS — M25.562 CHRONIC PAIN OF LEFT KNEE: Primary | ICD-10-CM

## 2024-03-04 DIAGNOSIS — M21.70 LEG LENGTH DISCREPANCY: ICD-10-CM

## 2024-03-04 PROCEDURE — 99213 OFFICE O/P EST LOW 20 MIN: CPT | Performed by: FAMILY MEDICINE

## 2024-03-04 PROCEDURE — 20610 DRAIN/INJ JOINT/BURSA W/O US: CPT | Performed by: FAMILY MEDICINE

## 2024-03-04 RX ADMIN — TRIAMCINOLONE ACETONIDE 40 MG: 40 INJECTION, SUSPENSION INTRA-ARTICULAR; INTRAMUSCULAR at 15:40

## 2024-03-04 RX ADMIN — LIDOCAINE HYDROCHLORIDE 2 ML: 20 INJECTION, SOLUTION INFILTRATION; PERINEURAL at 15:40

## 2024-03-04 NOTE — PROGRESS NOTES
"Large joint arthrocentesis: L knee  Universal Protocol:  Procedure performed by: (Yamile Chappell)  Consent: Verbal consent obtained. Written consent not obtained.  Risks and benefits: risks, benefits and alternatives were discussed  Consent given by: patient  Time out: Immediately prior to procedure a \"time out\" was called to verify the correct patient, procedure, equipment, support staff and site/side marked as required.  Timeout called at: 3/4/2024 4:20 PM.  Patient understanding: patient states understanding of the procedure being performed  Patient consent: the patient's understanding of the procedure matches consent given  Procedure consent: procedure consent matches procedure scheduled  Site marked: the operative site was marked  Radiology Images displayed and confirmed. If images not available, report reviewed: imaging studies available  Patient identity confirmed: verbally with patient  Supporting Documentation  Indications: pain   Procedure Details  Location: knee - L knee  Preparation: Patient was prepped and draped in the usual sterile fashion  Needle size: 22 G  Ultrasound guidance: no  Approach: lateral  Medications administered: 40 mg triamcinolone acetonide 40 mg/mL; 2 mL lidocaine 2 %    Patient tolerance: patient tolerated the procedure well with no immediate complications    No erythema,bleeding, or increased warmth noted to left knee. Tolerated procedure well.         Name: Aguila Aquino      : 1995      MRN: [id number]  Encounter Provider: Yamile Chappell MD  Encounter Date: 3/4/2024   Encounter department: [facility]    Assessment & Plan     1. Chronic pain of left knee  Assessment & Plan:  -Hx of Osgood-Schlatter's disease of left lower extremity   -Acute on chronic pain of left knee  -Likely due to leg-length discrepancy   - 22 MRI left knee- Normal; no tear or tendinosis  -ICS discussed with patient and he was in agreement.  - " "Offered PT but patient not interested at this time.  - Encouraged continued use of NSAIDs, heat, ice for the next week.         Orders:  -     Large joint arthrocentesis: L knee    2. Leg length discrepancy  -     Ambulatory Referral to Podiatry; Future           Subjective        Aguila Aquino is a 28 y.o. male with pmhx of Osgood-Schlatter's disease of left lower extremity  presenting today for acute on chronic back and left knee pain.  He is unsure what triggered pain but has been having increasing difficulty walking for the past few days. He has observed that his knee pain has made his back pain worse over time.       Review of Systems   Constitutional: Negative.    HENT: Negative.     Respiratory: Negative.     Cardiovascular: Negative.    Gastrointestinal: Negative.    Genitourinary: Negative.    Musculoskeletal:  Positive for back pain and gait problem. Negative for joint swelling.       Current Outpatient Medications on File Prior to Visit   Medication Sig    lidocaine (Lidoderm) 5 % Apply 1 patch topically daily Remove & Discard patch within 12 hours or as directed by MD    methocarbamol (ROBAXIN) 500 mg tablet Take 1 tablet (500 mg total) by mouth 4 (four) times a day for 14 days    sertraline (ZOLOFT) 25 mg tablet Take 1 tablet (25 mg total) by mouth daily       Objective     /60 (BP Location: Right arm, Patient Position: Sitting, Cuff Size: Standard)   Pulse 77   Temp 97.5 °F (36.4 °C) (Temporal)   Resp 16   Ht 6' 1\" (1.854 m)   Wt 77.9 kg (171 lb 12.8 oz)   SpO2 97%   BMI 22.67 kg/m²     Physical Exam  Vitals reviewed.   Constitutional:       General: He is not in acute distress.     Appearance: He is normal weight. He is not ill-appearing, toxic-appearing or diaphoretic.   Eyes:      Extraocular Movements: Extraocular movements intact.      Conjunctiva/sclera: Conjunctivae normal.   Cardiovascular:      Pulses: Normal pulses.   Pulmonary:      Effort: Pulmonary effort is normal. "   Musculoskeletal:      Right knee: No swelling, effusion, erythema, bony tenderness or crepitus. Normal patellar mobility.      Instability Tests: Anterior drawer test negative.      Left knee: Bony tenderness present. No swelling, effusion, erythema or crepitus. Normal patellar mobility.      Instability Tests: Anterior drawer test negative.      Right lower leg: No edema.      Left lower leg: No edema.        Legs:       Comments: Leg-length discrepancy noted.   Skin:     General: Skin is warm.   Neurological:      Mental Status: He is alert.       Yamile Chappell MD

## 2024-03-05 PROBLEM — M25.562 CHRONIC PAIN OF LEFT KNEE: Status: ACTIVE | Noted: 2024-03-05

## 2024-03-05 PROBLEM — M21.70 LEG LENGTH DISCREPANCY: Status: ACTIVE | Noted: 2024-03-05

## 2024-03-05 PROBLEM — G89.29 CHRONIC PAIN OF LEFT KNEE: Status: ACTIVE | Noted: 2024-03-05

## 2024-03-05 RX ORDER — TRIAMCINOLONE ACETONIDE 40 MG/ML
40 INJECTION, SUSPENSION INTRA-ARTICULAR; INTRAMUSCULAR
Status: COMPLETED | OUTPATIENT
Start: 2024-03-04 | End: 2024-03-04

## 2024-03-05 RX ORDER — LIDOCAINE HYDROCHLORIDE 20 MG/ML
2 INJECTION, SOLUTION INFILTRATION; PERINEURAL
Status: COMPLETED | OUTPATIENT
Start: 2024-03-04 | End: 2024-03-04

## 2024-03-05 NOTE — ASSESSMENT & PLAN NOTE
-Hx of Osgood-Schlatter's disease of left lower extremity   -Acute on chronic pain of left knee  -Likely due to leg-length discrepancy   - 4/8/22 MRI left knee- Normal; no tear or tendinosis  -ICS discussed with patient and he was in agreement.  - Offered PT but patient not interested at this time.  - Encouraged continued use of NSAIDs, heat, ice for the next week.

## 2024-03-19 NOTE — PROGRESS NOTES
1. Chronic pain of left knee    2. History of Osgood-Schlatter disease    3. Patellar tendonitis of left knee    4. Patellofemoral pain syndrome of left knee      Orders Placed This Encounter   Procedures    Brace    Ambulatory Referral to Physical Therapy      PAST REPORTS:    MRI left knee 4/8/2022  Physician review: chondral thinning patello femoral joint with chondromalacia    Report:  Normal MRI of the left knee.     XR left knee 3/9/2022  No significant degenerative changes.   No acute osseous abnormality.     XR left knee 6/29/2021  No significant degenerative changes.   No acute osseous abnormality.     ASSESSMENT/PLAN:    Chronic left anterior knee pain x 10 years  Chronic patella tendonitis  Tibial tubercle apophysitis  PFPS with chondromalacia and chondral thinning on physician review of MRI 4/8/22    PMHX  Osgood-Schlatter disease left knee  Leg length discrepancy, longer left leg    Repeat X-ray next visit: None    Return for follow-up after completing 6 weeks of PT.    Patient instructions below verbally summarized in person during encounter:  Patient Instructions   Wear patella stabilizing brace during day time  Start formal physical therapy      __________________________________________________________________________    HISTORY OF PRESENT ILLNESS:    28 y.o. male presents with mother for initial evaluation of acute exacerbation of chronic intermittent left knee pain.  Patient previously seen by [facility] back in 3/29/2022 for his knee pain with suspected patella tendinitis and left knee contusion.  He was also seen by his PCP on 3/20/2024 for this problem with concern for chronic left knee pain suspected secondary to leg length discrepancy.  At that visit, patient was recommended to start formal physical therapy and instructed to follow-up with [facility].    3/4/2024 [facility] CSI left knee (by PCP) -- without relief.    3/21/2024:  Today patient reports     Injury: No  PMHX: back in 3/29/2022 patella tendinitis  and left knee contusion    Left knee pain:  Location: anterior  Starting/onset: chronic persistent, started more than 10 years ago  Pain scale: 8/10 average, 10/10 at worst, pain from condition does interfere with activities of daily living  Description: sharp  Rest pain: yes and mild  Radiation: none  Mechanical sx: denies knee giving out, knee locking, foreign body sensation, or popping sensation   Aggravation: going up and down stairs, kneeling, squatting, and standing  Treatment: muscle relaxer, NSAIDS with temporary relief  Previous PT: none  Previous CSI: 3/4/2024 [facility] CSI left knee (by PCP)    Following history reviewed and updated:  Patient Active Problem List    Diagnosis Date Noted    History of Osgood-Schlatter disease 03/21/2024    Patellar tendonitis of left knee 03/21/2024    Patellofemoral pain syndrome of left knee 03/21/2024    Tobacco use 03/20/2024    Chronic pain of left knee 03/05/2024    Leg length discrepancy 03/05/2024    Depression 09/28/2023    Acute bilateral low back pain without sciatica 03/14/2022    Patellar tendonitis 07/07/2014    Patellofemoral syndrome, left 05/21/2014     Meds/Allergies   Current Outpatient Medications on File Prior to Visit   Medication Sig    methocarbamol (ROBAXIN) 500 mg tablet Take 1 tablet (500 mg total) by mouth 4 (four) times a day for 14 days     No Known Allergies  Historical Information   No past medical history on file.  No past surgical history on file.  Social History     Socioeconomic History    Marital status: Single     Spouse name: Not on file    Number of children: Not on file    Years of education: Not on file    Highest education level: Not on file   Occupational History    Not on file   Tobacco Use    Smoking status: Every Day     Current packs/day: 0.50     Types: Cigarettes    Smokeless tobacco: Never   Vaping Use    Vaping status: Never Used   Substance and Sexual Activity    Alcohol use: Never    Drug use: Yes     Types: Marijuana      Comment: medical     Sexual activity: Not on file   Other Topics Concern    Not on file   Social History Narrative    Not on file     Social Determinants of Health     Financial Resource Strain: Low Risk  (3/4/2024)    Overall Financial Resource Strain (CARDIA)     Difficulty of Paying Living Expenses: Not hard at all   Food Insecurity: No Food Insecurity (3/4/2024)    Hunger Vital Sign     Worried About Running Out of Food in the Last Year: Never true     Ran Out of Food in the Last Year: Never true   Transportation Needs: No Transportation Needs (3/4/2024)    PRAPARE - Transportation     Lack of Transportation (Medical): No     Lack of Transportation (Non-Medical): No   Physical Activity: Not on file   Stress: Not on file   Social Connections: Not on file   Intimate Partner Violence: Not on file   Housing Stability: Low Risk  (3/20/2024)    Housing Stability Vital Sign     Unable to Pay for Housing in the Last Year: No     Number of Places Lived in the Last Year: 1     Unstable Housing in the Last Year: No     No family history on file.     /62 (BP Location: Left arm, Patient Position: Sitting, Cuff Size: Standard)   Ht 6' (1.829 m)   Wt 77.6 kg (171 lb)   BMI 23.19 kg/m²     PHYSICAL EXAM:    Left Knee Examination:    Patient ambulates with Normal gait  Assistive Device: No    Skin is warm and dry to touch with erythema, increased warmth, or ecchymosis   Effusion: none  Tenderness: +Patellar tendon, tibial tuberosity    Flexor and extensor mechanisms are intact  Flexion ROM: 135/135° supine in bed   Extension ROM: 0/0° without pain  Strength: 5/5 throughout    Knee is stable to varus and valgus stress  Kd's:  Negative   Lachman's:  Negative  Posterior Drawer:  Negative    Patella tracks centrally with palpable crepitus  Patellar Grind Milton's:  + Significant pain, reproduces chief complaint  Lateral patellar displacement:  1/2  Medial patellar displacement:  1/2  Patellar Apprehension:   Negative    Calf compartments are soft and supple  Corral's sign:  Negative    __________________________________________________________________________  Procedures

## 2024-03-20 ENCOUNTER — OFFICE VISIT (OUTPATIENT)
Dept: FAMILY MEDICINE CLINIC | Facility: CLINIC | Age: 29
End: 2024-03-20

## 2024-03-20 VITALS
WEIGHT: 171 LBS | TEMPERATURE: 98 F | DIASTOLIC BLOOD PRESSURE: 60 MMHG | OXYGEN SATURATION: 99 % | BODY MASS INDEX: 22.56 KG/M2 | HEART RATE: 63 BPM | SYSTOLIC BLOOD PRESSURE: 114 MMHG

## 2024-03-20 DIAGNOSIS — M25.562 CHRONIC PAIN OF LEFT KNEE: Primary | ICD-10-CM

## 2024-03-20 DIAGNOSIS — F32.89 OTHER DEPRESSION: ICD-10-CM

## 2024-03-20 DIAGNOSIS — Z72.0 TOBACCO USE: ICD-10-CM

## 2024-03-20 DIAGNOSIS — M54.50 ACUTE BILATERAL LOW BACK PAIN WITHOUT SCIATICA: ICD-10-CM

## 2024-03-20 DIAGNOSIS — G89.29 CHRONIC PAIN OF LEFT KNEE: Primary | ICD-10-CM

## 2024-03-20 PROBLEM — M92.522 OSGOOD-SCHLATTER'S DISEASE OF LEFT LOWER EXTREMITY: Status: RESOLVED | Noted: 2023-09-28 | Resolved: 2024-03-20

## 2024-03-20 PROCEDURE — 99213 OFFICE O/P EST LOW 20 MIN: CPT | Performed by: NURSE PRACTITIONER

## 2024-03-20 RX ORDER — METHOCARBAMOL 500 MG/1
500 TABLET, FILM COATED ORAL 4 TIMES DAILY
Qty: 56 TABLET | Refills: 0 | Status: SHIPPED | OUTPATIENT
Start: 2024-03-20 | End: 2024-04-03

## 2024-03-20 NOTE — ASSESSMENT & PLAN NOTE
At last visit 9/28/23, patient reported depressive symptoms and was started on sertraline. He did not continue to take the medication and did not follow up with mental health referral.   Today he reports his mood is much improved and is no longer having depressive symptoms. He feels it was likely a situational depression and has resolved.

## 2024-03-20 NOTE — LETTER
March 20, 2024     Patient: Aguila Aquino   YOB: 1995   Date of Visit: 3/20/2024       To Whom it May Concern:    Aguila Aquino was seen in my clinic on 3/20/2024. He may return to work on 3/21/2024 with a limit of 4 hours per work day 20 hours/ week . This will be in effect for 2 weeks until 4/3/2024 at which time he can return back to full duty.     If you have any questions or concerns, please don't hesitate to call.         Sincerely,          JESSICA Can        CC: No Recipients

## 2024-03-20 NOTE — ASSESSMENT & PLAN NOTE
- No red flag signs or symptoms  - Continue Tylenol a 1000 mg q.8, naproxen 500 b.i.d. as he reports this is helpful.  - Pain possibly related to leg-length discrepancy; following orthopedics for management.  - PT referral given

## 2024-03-20 NOTE — ASSESSMENT & PLAN NOTE
Was seen by Dr. Chappell 3/5/24 for ICS injection to the knee. He reports it was not as helpful as he had hoped.   - He has an appointment at orthopedics tomorrow 3/21/24 and will defer further management to their service.   -Likely due to leg-length discrepancy   - 4/8/22 MRI left knee- Normal; no tear or tendinosis  - PT referral given as patient is now agreeable.   - Encouraged continued use of NSAIDs, heat, ice.

## 2024-03-20 NOTE — PROGRESS NOTES
Name: Aguila Aquino      : 1995      MRN: [id number]  Encounter Provider: JESSICA Can  Encounter Date: 3/20/2024   Encounter department: [facility]    Assessment & Plan     1. Chronic pain of left knee  Assessment & Plan:  Was seen by Dr. Chappell 3/5/24 for ICS injection to the knee. He reports it was not as helpful as he had hoped.   - He has an appointment at orthopedics tomorrow 3/21/24 and will defer further management to their service.   -Likely due to leg-length discrepancy   - 22 MRI left knee- Normal; no tear or tendinosis  - PT referral given as patient is now agreeable.   - Encouraged continued use of NSAIDs, heat, ice.      Orders:  -     Ambulatory Referral to Physical Therapy; Future  -     methocarbamol (ROBAXIN) 500 mg tablet; Take 1 tablet (500 mg total) by mouth 4 (four) times a day for 14 days    2. Acute bilateral low back pain without sciatica  Assessment & Plan:  - No red flag signs or symptoms  - Continue Tylenol a 1000 mg q.8, naproxen 500 b.i.d. as he reports this is helpful.  - Pain possibly related to leg-length discrepancy; following orthopedics for management.  - PT referral given    Orders:  -     Ambulatory Referral to Physical Therapy; Future  -     methocarbamol (ROBAXIN) 500 mg tablet; Take 1 tablet (500 mg total) by mouth 4 (four) times a day for 14 days    3. Other depression  Assessment & Plan:  At last visit 23, patient reported depressive symptoms and was started on sertraline. He did not continue to take the medication and did not follow up with mental health referral.   Today he reports his mood is much improved and is no longer having depressive symptoms. He feels it was likely a situational depression and has resolved.       4. Tobacco use  Assessment & Plan:  -continue to encourage cessation  -patient is aware of pharmacotherapy aids to assist with cessation            Patient previously ordered lab  work for routine screening, but has not yet completed it. Encouraged to complete blood work and make a follow up appointment for an annual physical in the next couple weeks. Patient agreeable.      Subjective     John Aquino is a 28 year old male who presents for follow up of chronic left knee and low back pain. He was recently seen by Dr. Chappell and given an ICS injection which he reports was minimally helpful. He was also seen by podiatry who referred him to orthopedics.      Review of Systems   Constitutional:  Negative for chills and fever.   HENT:  Negative for ear pain and sore throat.    Eyes:  Negative for pain and visual disturbance.   Respiratory:  Negative for cough and shortness of breath.    Cardiovascular:  Negative for chest pain and palpitations.   Gastrointestinal:  Negative for abdominal pain and vomiting.   Genitourinary:  Negative for dysuria and hematuria.   Musculoskeletal:  Positive for arthralgias and back pain.   Skin:  Negative for color change and rash.   Neurological:  Negative for seizures and syncope.   All other systems reviewed and are negative.      History reviewed. No pertinent past medical history.  History reviewed. No pertinent surgical history.  History reviewed. No pertinent family history.  Social History     Socioeconomic History    Marital status: Single     Spouse name: None    Number of children: None    Years of education: None    Highest education level: None   Occupational History    None   Tobacco Use    Smoking status: Every Day     Current packs/day: 0.50     Types: Cigarettes    Smokeless tobacco: Never   Vaping Use    Vaping status: Never Used   Substance and Sexual Activity    Alcohol use: Never    Drug use: Yes     Types: Marijuana     Comment: medical     Sexual activity: None   Other Topics Concern    None   Social History Narrative    None     Social Determinants of Health     Financial Resource Strain: Low Risk  (3/4/2024)    Overall Financial Resource  Strain (CARDIA)     Difficulty of Paying Living Expenses: Not hard at all   Food Insecurity: No Food Insecurity (3/4/2024)    Hunger Vital Sign     Worried About Running Out of Food in the Last Year: Never true     Ran Out of Food in the Last Year: Never true   Transportation Needs: No Transportation Needs (3/4/2024)    PRAPARE - Transportation     Lack of Transportation (Medical): No     Lack of Transportation (Non-Medical): No   Physical Activity: Not on file   Stress: Not on file   Social Connections: Not on file   Intimate Partner Violence: Not on file   Housing Stability: Low Risk  (3/20/2024)    Housing Stability Vital Sign     Unable to Pay for Housing in the Last Year: No     Number of Places Lived in the Last Year: 1     Unstable Housing in the Last Year: No     Current Outpatient Medications on File Prior to Visit   Medication Sig    [DISCONTINUED] lidocaine (Lidoderm) 5 % Apply 1 patch topically daily Remove & Discard patch within 12 hours or as directed by MD    [DISCONTINUED] methocarbamol (ROBAXIN) 500 mg tablet Take 1 tablet (500 mg total) by mouth 4 (four) times a day for 14 days    [DISCONTINUED] sertraline (ZOLOFT) 25 mg tablet Take 1 tablet (25 mg total) by mouth daily     No Known Allergies  Immunization History   Administered Date(s) Administered    DTaP 02/01/1996, 04/03/1996, 06/06/1996, 03/06/1997, 01/08/2001, 11/30/2007    HPV Quadrivalent 01/26/2012, 05/21/2014    Hep B, Adolescent or Pediatric 1995, 02/01/1996, 06/06/1996    Hepatitis A 01/29/2009    HiB 02/01/1996, 04/03/1996, 06/06/1996, 03/06/1997    IPV 02/01/1996, 04/03/1996, 06/06/1996, 01/08/2001    MMR 12/05/1996, 01/08/2001    Meningococcal MCV4, Unspecified 01/29/2009, 05/21/2014    Meningococcal MCV4P 01/29/2009, 05/21/2014    Meningococcal, Unknown Serogroups 05/21/2014    Td (adult), adsorbed 07/14/2007    Tdap 01/29/2009    Varicella 03/05/1998, 01/29/2009       Objective     /60 (BP Location: Right arm, Patient  Position: Sitting, Cuff Size: Standard)   Pulse 63   Temp 98 °F (36.7 °C) (Temporal)   Wt 77.6 kg (171 lb)   SpO2 99%   BMI 22.56 kg/m²     Physical Exam  Constitutional:       General: He is not in acute distress.     Appearance: He is normal weight.   HENT:      Right Ear: External ear normal.      Left Ear: External ear normal.   Eyes:      General:         Right eye: No discharge.         Left eye: No discharge.   Cardiovascular:      Rate and Rhythm: Normal rate and regular rhythm.      Pulses: Normal pulses.      Heart sounds: Normal heart sounds. No murmur heard.  Pulmonary:      Effort: Pulmonary effort is normal. No respiratory distress.      Breath sounds: Normal breath sounds. No wheezing.   Abdominal:      General: Bowel sounds are normal.      Palpations: Abdomen is soft.      Tenderness: There is no abdominal tenderness.   Musculoskeletal:         General: No swelling or tenderness.   Skin:     General: Skin is warm and dry.   Neurological:      Mental Status: He is alert and oriented to person, place, and time.   Psychiatric:         Mood and Affect: Mood normal.         Behavior: Behavior normal.       JESSICA Can

## 2024-03-21 ENCOUNTER — OFFICE VISIT (OUTPATIENT)
Dept: OBGYN CLINIC | Facility: OTHER | Age: 29
End: 2024-03-21
Payer: MEDICARE

## 2024-03-21 VITALS
SYSTOLIC BLOOD PRESSURE: 124 MMHG | BODY MASS INDEX: 23.16 KG/M2 | HEIGHT: 72 IN | DIASTOLIC BLOOD PRESSURE: 62 MMHG | WEIGHT: 171 LBS

## 2024-03-21 DIAGNOSIS — M76.52 PATELLAR TENDONITIS OF LEFT KNEE: ICD-10-CM

## 2024-03-21 DIAGNOSIS — M22.2X2 PATELLOFEMORAL PAIN SYNDROME OF LEFT KNEE: ICD-10-CM

## 2024-03-21 DIAGNOSIS — G89.29 CHRONIC PAIN OF LEFT KNEE: Primary | ICD-10-CM

## 2024-03-21 DIAGNOSIS — Z87.39 HISTORY OF OSGOOD-SCHLATTER DISEASE: ICD-10-CM

## 2024-03-21 DIAGNOSIS — M25.562 CHRONIC PAIN OF LEFT KNEE: Primary | ICD-10-CM

## 2024-03-21 PROCEDURE — 99214 OFFICE O/P EST MOD 30 MIN: CPT | Performed by: FAMILY MEDICINE

## 2024-03-21 NOTE — PATIENT INSTRUCTIONS
Wear patella stabilizing brace during day time  Start formal physical therapy  Will consider PRP and needle tenotomy for patellar tendon based on exam next visit

## 2024-03-24 ENCOUNTER — HOSPITAL ENCOUNTER (EMERGENCY)
Facility: HOSPITAL | Age: 29
Discharge: HOME | End: 2024-03-24
Attending: EMERGENCY MEDICINE | Admitting: EMERGENCY MEDICINE
Payer: MEDICARE

## 2024-03-24 VITALS
RESPIRATION RATE: 18 BRPM | WEIGHT: 175.27 LBS | BODY MASS INDEX: 23.77 KG/M2 | OXYGEN SATURATION: 99 % | SYSTOLIC BLOOD PRESSURE: 148 MMHG | TEMPERATURE: 97.8 F | DIASTOLIC BLOOD PRESSURE: 63 MMHG | HEART RATE: 68 BPM

## 2024-03-24 DIAGNOSIS — R21 RASH AND NONSPECIFIC SKIN ERUPTION: Primary | ICD-10-CM

## 2024-03-24 PROCEDURE — 99282 EMERGENCY DEPT VISIT SF MDM: CPT

## 2024-03-24 PROCEDURE — 99284 EMERGENCY DEPT VISIT MOD MDM: CPT | Performed by: EMERGENCY MEDICINE

## 2024-03-24 RX ORDER — PREDNISONE 20 MG/1
60 TABLET ORAL DAILY
Qty: 12 TABLET | Refills: 0 | Status: SHIPPED | OUTPATIENT
Start: 2024-03-24 | End: 2024-03-28

## 2024-03-24 RX ORDER — PERMETHRIN 50 MG/G
CREAM TOPICAL
Qty: 60 G | Refills: 0 | Status: SHIPPED | OUTPATIENT
Start: 2024-03-24

## 2024-03-24 RX ORDER — PREDNISONE 20 MG/1
60 TABLET ORAL ONCE
Status: COMPLETED | OUTPATIENT
Start: 2024-03-24 | End: 2024-03-24

## 2024-03-24 RX ADMIN — PREDNISONE 60 MG: 20 TABLET ORAL at 16:26

## 2024-03-24 NOTE — Clinical Note
Aguila Aquino was seen and treated in our emergency department on 3/24/2024.                Diagnosis:     Aguila  may return to work on return date.    He may return on this date: 03/26/2024         If you have any questions or concerns, please don't hesitate to call.      Emil Martins MD    ______________________________           _______________          _______________  Hospital Representative                              Date                                Time

## 2024-03-24 NOTE — ED PROVIDER NOTES
History  Chief Complaint   Patient presents with    Rash     Pt c/o red rash to torso for the last week, c/o itching        History provided by:  Patient   used: No    Rash  Location:  Torso  Torso rash location:  Upper back, L chest and R chest  Severity:  Moderate  Onset quality:  Gradual  Duration:  2 days  Timing:  Intermittent  Progression:  Unchanged  Chronicity:  New  Context: new detergent/soap    Context: not medications and not plant contact    Context comment:  Possibly new detergent  Relieved by:  Nothing  Worsened by:  Nothing  Ineffective treatments:  Antihistamines  Associated symptoms: no abdominal pain, no diarrhea, no fever, no headaches, no nausea, no shortness of breath, no sore throat and not vomiting        Prior to Admission Medications   Prescriptions Last Dose Informant Patient Reported? Taking?   methocarbamol (ROBAXIN) 500 mg tablet   No No   Sig: Take 1 tablet (500 mg total) by mouth 4 (four) times a day for 14 days      Facility-Administered Medications: None       History reviewed. No pertinent past medical history.    History reviewed. No pertinent surgical history.    History reviewed. No pertinent family history.  I have reviewed and agree with the history as documented.    E-Cigarette/Vaping    E-Cigarette Use Never User      E-Cigarette/Vaping Substances    Nicotine No     THC No     CBD No     Flavoring No     Other No     Unknown No      Social History     Tobacco Use    Smoking status: Every Day     Current packs/day: 0.50     Types: Cigarettes    Smokeless tobacco: Never   Vaping Use    Vaping status: Never Used   Substance Use Topics    Alcohol use: Never    Drug use: Yes     Types: Marijuana     Comment: medical        Review of Systems   Constitutional:  Negative for chills and fever.   HENT:  Negative for facial swelling, sore throat and trouble swallowing.    Eyes:  Negative for pain and visual disturbance.   Respiratory:  Negative for cough, chest  tightness and shortness of breath.    Cardiovascular:  Negative for chest pain and leg swelling.   Gastrointestinal:  Negative for abdominal pain, blood in stool, diarrhea, nausea and vomiting.   Genitourinary:  Negative for dysuria and flank pain.   Musculoskeletal:  Negative for back pain, neck pain and neck stiffness.   Skin:  Positive for rash. Negative for pallor.   Allergic/Immunologic: Negative for environmental allergies and immunocompromised state.   Neurological:  Negative for dizziness and headaches.   Hematological:  Negative for adenopathy. Does not bruise/bleed easily.   Psychiatric/Behavioral:  Negative for agitation and behavioral problems.    All other systems reviewed and are negative.      Physical Exam  Physical Exam  Vitals and nursing note reviewed.   Constitutional:       General: He is not in acute distress.     Appearance: He is well-developed.   HENT:      Head: Normocephalic and atraumatic.      Mouth/Throat:      Comments: No lip or tongue swelling, no mucosal rash  Eyes:      Extraocular Movements: Extraocular movements intact.   Cardiovascular:      Rate and Rhythm: Normal rate and regular rhythm.      Heart sounds: Normal heart sounds.   Pulmonary:      Effort: Pulmonary effort is normal.      Breath sounds: Normal breath sounds.   Abdominal:      Palpations: Abdomen is soft.      Tenderness: There is no abdominal tenderness. There is no guarding or rebound.   Musculoskeletal:         General: Normal range of motion.      Cervical back: Normal range of motion and neck supple.   Skin:     General: Skin is warm and dry.      Findings: Rash present.      Comments: Erythematous patchy nonblanching rash to torso on chest and back   Neurological:      General: No focal deficit present.      Mental Status: He is alert and oriented to person, place, and time.   Psychiatric:         Mood and Affect: Mood normal.         Behavior: Behavior normal.         Vital Signs  ED Triage Vitals [03/24/24  1543]   Temperature Pulse Respirations Blood Pressure SpO2   97.8 °F (36.6 °C) 68 18 148/63 99 %      Temp Source Heart Rate Source Patient Position - Orthostatic VS BP Location FiO2 (%)   Oral Monitor -- -- --      Pain Score       No Pain           Vitals:    03/24/24 1543   BP: 148/63   Pulse: 68         Visual Acuity      ED Medications  Medications   predniSONE tablet 60 mg (60 mg Oral Given 3/24/24 1626)       Diagnostic Studies  Results Reviewed       None                   No orders to display              Procedures  Procedures         ED Course                                             Medical Decision Making  Patient is a 28-year-old male, comes in with rash to torso going on for past couple of days, took Benadryl with transient relief, rash is itchy, possibly used a new detergent, no new medication, plant contact.  On exam, patient is well-appearing, no distress, no oral/mucosal involvement, patchy erythematous nonblanching rash over torso.  Differential diagnosis: Nonspecific allergic reaction, possible scabies, will give prednisone, advised Benadryl OTC, permethrin.    Problems Addressed:  Rash and nonspecific skin eruption: acute illness or injury    Risk  Prescription drug management.             Disposition  Final diagnoses:   Rash and nonspecific skin eruption     Time reflects when diagnosis was documented in both MDM as applicable and the Disposition within this note       Time User Action Codes Description Comment    3/24/2024  4:27 PM Emil Martins Add [R21] Rash and nonspecific skin eruption           ED Disposition       ED Disposition   Discharge    Condition   Stable    Date/Time   Sun Mar 24, 2024  4:27 PM    Comment   Aguila Aquino discharge to home/self care.                   Follow-up Information    None         Patient's Medications   Discharge Prescriptions    PERMETHRIN (ELIMITE) 5 % CREAM    Apply to affected area once       Start Date: 3/24/2024 End Date: --       Order  Dose: --       Quantity: 60 g    Refills: 0    PREDNISONE 20 MG TABLET    Take 3 tablets (60 mg total) by mouth daily for 4 days       Start Date: 3/24/2024 End Date: 3/28/2024       Order Dose: 60 mg       Quantity: 12 tablet    Refills: 0       No discharge procedures on file.    PDMP Review       None            ED Provider  Electronically Signed by             Emil Martins MD  03/24/24 2493

## 2024-06-15 ENCOUNTER — HOSPITAL ENCOUNTER (EMERGENCY)
Facility: HOSPITAL | Age: 29
Discharge: HOME | End: 2024-06-15
Attending: EMERGENCY MEDICINE
Payer: MEDICARE

## 2024-06-15 VITALS
SYSTOLIC BLOOD PRESSURE: 143 MMHG | DIASTOLIC BLOOD PRESSURE: 76 MMHG | OXYGEN SATURATION: 100 % | HEART RATE: 104 BPM | WEIGHT: 174.38 LBS | TEMPERATURE: 98.6 F | BODY MASS INDEX: 23.65 KG/M2 | RESPIRATION RATE: 16 BRPM

## 2024-06-15 DIAGNOSIS — K08.89 DENTALGIA: Primary | ICD-10-CM

## 2024-06-15 PROCEDURE — 96372 THER/PROPH/DIAG INJ SC/IM: CPT

## 2024-06-15 PROCEDURE — 99282 EMERGENCY DEPT VISIT SF MDM: CPT

## 2024-06-15 PROCEDURE — 99284 EMERGENCY DEPT VISIT MOD MDM: CPT | Performed by: EMERGENCY MEDICINE

## 2024-06-15 RX ORDER — KETOROLAC TROMETHAMINE 30 MG/ML
15 INJECTION, SOLUTION INTRAMUSCULAR; INTRAVENOUS ONCE
Status: COMPLETED | OUTPATIENT
Start: 2024-06-15 | End: 2024-06-15

## 2024-06-15 RX ORDER — OXYCODONE HYDROCHLORIDE AND ACETAMINOPHEN 5; 325 MG/1; MG/1
1 TABLET ORAL EVERY 4 HOURS PRN
Qty: 10 TABLET | Refills: 0 | Status: SHIPPED | OUTPATIENT
Start: 2024-06-15

## 2024-06-15 RX ORDER — PENICILLIN V POTASSIUM 500 MG/1
500 TABLET ORAL 4 TIMES DAILY
Qty: 28 TABLET | Refills: 0 | Status: SHIPPED | OUTPATIENT
Start: 2024-06-15 | End: 2024-06-22

## 2024-06-15 RX ORDER — NAPROXEN 500 MG/1
500 TABLET ORAL 2 TIMES DAILY WITH MEALS
Qty: 20 TABLET | Refills: 0 | Status: SHIPPED | OUTPATIENT
Start: 2024-06-15

## 2024-06-15 RX ORDER — PENICILLIN V POTASSIUM 250 MG/1
500 TABLET ORAL ONCE
Status: COMPLETED | OUTPATIENT
Start: 2024-06-15 | End: 2024-06-15

## 2024-06-15 RX ADMIN — PENICILLIN V POTASSIUM 500 MG: 250 TABLET, FILM COATED ORAL at 11:48

## 2024-06-15 RX ADMIN — KETOROLAC TROMETHAMINE 15 MG: 30 INJECTION, SOLUTION INTRAMUSCULAR; INTRAVENOUS at 11:48

## 2024-06-15 NOTE — ED PROVIDER NOTES
History  Chief Complaint   Patient presents with    Dental Pain     Pt reports L side of mouth x3 days. States pain and difficulty chewing     28 y.o. M p/w left dental pain x 3 days.  Had an appt with his dentist in 2 days.  Has tried Orajel and salt water rinses without relief.  Took a left over PCN he had.  Denies F/C, N/V, facial swelling, difficulty swallowing, neck pain/stiffness.      History provided by:  Patient   used: No    Dental Pain  Associated symptoms: no drooling, no facial swelling, no fever and no neck pain        Prior to Admission Medications   Prescriptions Last Dose Informant Patient Reported? Taking?   methocarbamol (ROBAXIN) 500 mg tablet   No No   Sig: Take 1 tablet (500 mg total) by mouth 4 (four) times a day for 14 days   permethrin (ELIMITE) 5 % cream   No No   Sig: Apply to affected area once      Facility-Administered Medications: None       History reviewed. No pertinent past medical history.    History reviewed. No pertinent surgical history.    History reviewed. No pertinent family history.  I have reviewed and agree with the history as documented.    E-Cigarette/Vaping    E-Cigarette Use Never User      E-Cigarette/Vaping Substances    Nicotine No     THC No     CBD No     Flavoring No     Other No     Unknown No      Social History     Tobacco Use    Smoking status: Every Day     Current packs/day: 0.50     Types: Cigarettes    Smokeless tobacco: Never   Vaping Use    Vaping status: Never Used   Substance Use Topics    Alcohol use: Never    Drug use: Yes     Types: Marijuana     Comment: medical        Review of Systems   Constitutional:  Negative for chills and fever.   HENT:  Positive for dental problem. Negative for drooling, facial swelling, sore throat and trouble swallowing.    Gastrointestinal:  Negative for nausea and vomiting.   Musculoskeletal:  Negative for neck pain and neck stiffness.       Physical Exam  Physical Exam  Vitals and nursing note  reviewed.   Constitutional:       General: He is not in acute distress.     Appearance: He is well-developed. He is not ill-appearing, toxic-appearing or diaphoretic.   HENT:      Head: Normocephalic.      Jaw: There is normal jaw occlusion. No trismus, tenderness, swelling, pain on movement or malocclusion.      Mouth/Throat:      Mouth: Mucous membranes are moist. Mucous membranes are not pale and not dry. No angioedema.      Dentition: Abnormal dentition. Dental tenderness (TTP to left lower and upper molars) present. No dental abscesses.      Tongue: Tongue does not deviate from midline.      Pharynx: Oropharynx is clear. Uvula midline. No pharyngeal swelling, oropharyngeal exudate, posterior oropharyngeal erythema or uvula swelling.      Tonsils: No tonsillar exudate or tonsillar abscesses.      Comments: No submandibular induration  Neck:      Trachea: Phonation normal.      Comments: No erythema overlying neck.  No masses or swelling.    Cardiovascular:      Rate and Rhythm: Normal rate and regular rhythm.      Heart sounds: Normal heart sounds.   Pulmonary:      Effort: Pulmonary effort is normal. No accessory muscle usage, respiratory distress or retractions.      Breath sounds: Normal breath sounds and air entry. No stridor. No wheezing, rhonchi or rales.   Musculoskeletal:      Cervical back: Normal range of motion and neck supple. No edema, erythema, rigidity or crepitus.   Lymphadenopathy:      Head:      Right side of head: No submental or submandibular adenopathy.      Left side of head: No submental or submandibular adenopathy.      Cervical: No cervical adenopathy.   Skin:     General: Skin is warm and dry.      Coloration: Skin is not cyanotic or mottled.      Findings: No erythema or rash.   Neurological:      Mental Status: He is alert. He is not disoriented.      GCS: GCS eye subscore is 4. GCS verbal subscore is 5. GCS motor subscore is 6.      Comments: Cranial nerves grossly intact          Vital Signs  ED Triage Vitals [06/15/24 1123]   Temperature Pulse Respirations Blood Pressure SpO2   98.6 °F (37 °C) 104 16 143/76 100 %      Temp Source Heart Rate Source Patient Position - Orthostatic VS BP Location FiO2 (%)   Oral Monitor Sitting Right arm --      Pain Score       --           Vitals:    06/15/24 1123   BP: 143/76   Pulse: 104   Patient Position - Orthostatic VS: Sitting         Visual Acuity      ED Medications  Medications   ketorolac (TORADOL) injection 15 mg (15 mg Intramuscular Given 6/15/24 1148)   penicillin V potassium (VEETID) tablet 500 mg (500 mg Oral Given 6/15/24 1148)       Diagnostic Studies  Results Reviewed       None                   No orders to display              Procedures  Procedures         ED Course                                             Medical Decision Making  Dental pain - Will give abx and pain meds.  Pt has dental f/u in 2 days.    Risk  Prescription drug management.             Disposition  Final diagnoses:   Dentalgia     Time reflects when diagnosis was documented in both MDM as applicable and the Disposition within this note       Time User Action Codes Description Comment    6/15/2024 11:37 AM Leilani Porras [K08.89] Dentalgia           ED Disposition       ED Disposition   Discharge    Condition   Stable    Date/Time   Sat Cirilo 15, 2024 11:37 AM    Comment   Aguila Aquino discharge to home/self care.                   Follow-up Information    None         Patient's Medications   Discharge Prescriptions    NAPROXEN (NAPROSYN) 500 MG TABLET    Take 1 tablet (500 mg total) by mouth 2 (two) times a day with meals       Start Date: 6/15/2024 End Date: --       Order Dose: 500 mg       Quantity: 20 tablet    Refills: 0    OXYCODONE-ACETAMINOPHEN (PERCOCET) 5-325 MG PER TABLET    Take 1 tablet by mouth every 4 (four) hours as needed for severe pain Max Daily Amount: 6 tablets       Start Date: 6/15/2024 End Date: --       Order Dose: 1 tablet        Quantity: 10 tablet    Refills: 0    PENICILLIN V POTASSIUM (VEETID) 500 MG TABLET    Take 1 tablet (500 mg total) by mouth 4 (four) times a day for 7 days       Start Date: 6/15/2024 End Date: 6/22/2024       Order Dose: 500 mg       Quantity: 28 tablet    Refills: 0       No discharge procedures on file.    PDMP Review       None            ED Provider  Electronically Signed by             Leilani Porras DO  06/15/24 1200

## 2024-07-18 ENCOUNTER — TELEPHONE (OUTPATIENT)
Age: 29
End: 2024-07-18

## 2024-07-18 NOTE — TELEPHONE ENCOUNTER
Reached out to patient in regards to routine wait list and possible scheduling.     Left VM for pt to contact intake department.

## 2024-07-19 ENCOUNTER — HOSPITAL ENCOUNTER (EMERGENCY)
Facility: HOSPITAL | Age: 29
Discharge: HOME | End: 2024-07-19
Attending: EMERGENCY MEDICINE
Payer: MEDICARE

## 2024-07-19 VITALS
DIASTOLIC BLOOD PRESSURE: 65 MMHG | HEART RATE: 84 BPM | WEIGHT: 173.28 LBS | SYSTOLIC BLOOD PRESSURE: 127 MMHG | RESPIRATION RATE: 16 BRPM | OXYGEN SATURATION: 99 % | BODY MASS INDEX: 23.5 KG/M2 | TEMPERATURE: 98.3 F
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
## 2024-07-19 DIAGNOSIS — S09.90XA CLOSED HEAD INJURY WITHOUT LOSS OF CONSCIOUSNESS, INITIAL ENCOUNTER: ICD-10-CM

## 2024-07-19 DIAGNOSIS — S05.12XA PERIORBITAL CONTUSION OF LEFT EYE, INITIAL ENCOUNTER: ICD-10-CM

## 2024-07-19 PROCEDURE — 99284 EMERGENCY DEPT VISIT MOD MDM: CPT | Performed by: EMERGENCY MEDICINE

## 2024-07-19 PROCEDURE — 99284 EMERGENCY DEPT VISIT MOD MDM: CPT

## 2024-07-19 RX ORDER — IBUPROFEN 600 MG/1
600 TABLET ORAL ONCE
Status: COMPLETED | OUTPATIENT
Start: 2024-07-19 | End: 2024-07-19

## 2024-07-19 RX ORDER — IBUPROFEN 600 MG/1
600 TABLET ORAL EVERY 6 HOURS PRN
Qty: 30 TABLET | Refills: 0 | Status: SHIPPED | OUTPATIENT
Start: 2024-07-19

## 2024-07-19 RX ADMIN — IBUPROFEN 600 MG: 600 TABLET, FILM COATED ORAL at 22:06

## 2024-07-19 NOTE — Clinical Note
Aguila Aquino was seen and treated in our emergency department on 7/19/2024.                Diagnosis: Head and facial contusion    Aguila  may return to work on return date.    He may return on this date: 07/22/2024    Please excuse from work for recovery from injury.      If you have any questions or concerns, please don't hesitate to call.      Adelfo Jalloh MD    ______________________________           _______________          _______________  Hospital Representative                              Date                                Time

## 2024-07-20 NOTE — DISCHARGE INSTRUCTIONS
Head Injury   WHAT YOU NEED TO KNOW:   A head injury is most often caused by a blow to the head. This may occur from a fall, bicycle injury, sports injury, being struck in the head, or a motor vehicle accident.   DISCHARGE INSTRUCTIONS:   Call 911 or have someone else call for any of the following:   You cannot be woken.    You have a seizure.    You stop responding to others or you faint.    You have blurry or double vision.    Your speech becomes slurred or confused.    You have arm or leg weakness, loss of feeling, or new problems with coordination.    Your pupils are larger than usual or one pupil is a different size than the other.     You have blood or clear fluid coming out of your ears or nose.  Return to the emergency department if:   You have repeated or forceful vomiting.    You feel confused.    Your headache gets worse or becomes severe.    You or someone caring for you notices that you are harder to wake than usual.  Contact your healthcare provider if:   Your symptoms last longer than 6 weeks after the injury.  Self-care:   Rest  or do quiet activities for 24 to 48 hours. Limit your time watching TV, using the computer, or doing tasks that require a lot of thinking. Slowly return to your normal activities as directed. Do not play sports or do activities that may cause you to get hit in the head. Ask your healthcare provider when you can return to sports.     Apply ice  on your head for 15 to 20 minutes every hour or as directed. Use an ice pack, or put crushed ice in a plastic bag. Cover it with a towel before you apply it to your skin. Ice helps prevent tissue damage and decreases swelling and pain.
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
## 2024-12-10 ENCOUNTER — HOSPITAL ENCOUNTER (EMERGENCY)
Facility: HOSPITAL | Age: 29
Discharge: HOME | End: 2024-12-10
Attending: EMERGENCY MEDICINE
Payer: MEDICARE

## 2024-12-10 VITALS
HEART RATE: 78 BPM | OXYGEN SATURATION: 100 % | BODY MASS INDEX: 24.76 KG/M2 | DIASTOLIC BLOOD PRESSURE: 65 MMHG | TEMPERATURE: 98.1 F | WEIGHT: 182.54 LBS | RESPIRATION RATE: 18 BRPM | SYSTOLIC BLOOD PRESSURE: 144 MMHG

## 2024-12-10 DIAGNOSIS — K02.9 DENTAL CARIES: Primary | ICD-10-CM

## 2024-12-10 DIAGNOSIS — K05.10 GINGIVITIS: ICD-10-CM

## 2024-12-10 DIAGNOSIS — K08.89 DENTALGIA: ICD-10-CM

## 2024-12-10 PROCEDURE — 96372 THER/PROPH/DIAG INJ SC/IM: CPT

## 2024-12-10 PROCEDURE — 99282 EMERGENCY DEPT VISIT SF MDM: CPT

## 2024-12-10 PROCEDURE — 99284 EMERGENCY DEPT VISIT MOD MDM: CPT | Performed by: EMERGENCY MEDICINE

## 2024-12-10 RX ORDER — CHLORHEXIDINE GLUCONATE ORAL RINSE 1.2 MG/ML
15 SOLUTION DENTAL 2 TIMES DAILY
Qty: 120 ML | Refills: 0 | Status: SHIPPED | OUTPATIENT
Start: 2024-12-10

## 2024-12-10 RX ORDER — KETOROLAC TROMETHAMINE 30 MG/ML
15 INJECTION, SOLUTION INTRAMUSCULAR; INTRAVENOUS ONCE
Status: COMPLETED | OUTPATIENT
Start: 2024-12-10 | End: 2024-12-10

## 2024-12-10 RX ORDER — PENICILLIN V POTASSIUM 500 MG/1
500 TABLET, FILM COATED ORAL 4 TIMES DAILY
Qty: 40 TABLET | Refills: 0 | Status: SHIPPED | OUTPATIENT
Start: 2024-12-10 | End: 2024-12-17

## 2024-12-10 RX ADMIN — KETOROLAC TROMETHAMINE 15 MG: 30 INJECTION, SOLUTION INTRAMUSCULAR; INTRAVENOUS at 12:57

## 2024-12-10 NOTE — ED PROVIDER NOTES
Time reflects when diagnosis was documented in both MDM as applicable and the Disposition within this note       Time User Action Codes Description Comment    12/10/2024 12:46 PM Frederic Winslow Add [K02.9] Dental caries     12/10/2024 12:46 PM Frederic Winslow Add [K08.89] Dentalgia     12/10/2024 12:46 PM Frederic Winslow Add [K05.10] Gingivitis           ED Disposition       ED Disposition   Discharge    Condition   Stable    Date/Time   Tue Dec 10, 2024 12:46 PM    Comment   Aguila Aquino discharge to home/self care.                   Assessment & Plan       Medical Decision Making  Left lower jaw pain secondary to dental caries and gingivitis.  No history exam finding suggest abscess, Isidro's angina.,  Facial cellulitis and imaging is not indicated.    Risk  Prescription drug management.             Medications   ketorolac (TORADOL) injection 15 mg (15 mg Intramuscular Given 12/10/24 1257)       ED Risk Strat Scores                                               History of Present Illness       Chief Complaint   Patient presents with    Dental Pain     Pt c/o left sided dental pain has seen a dentist but no relief        History reviewed. No pertinent past medical history.   History reviewed. No pertinent surgical history.   History reviewed. No pertinent family history.   Social History     Tobacco Use    Smoking status: Every Day     Current packs/day: 0.50     Types: Cigarettes    Smokeless tobacco: Never   Vaping Use    Vaping status: Never Used   Substance Use Topics    Alcohol use: Never    Drug use: Yes     Types: Marijuana     Comment: medical       E-Cigarette/Vaping    E-Cigarette Use Never User       E-Cigarette/Vaping Substances    Nicotine No     THC No     CBD No     Flavoring No     Other No     Unknown No       I have reviewed and agree with the history as documented.     29-year-old male presents for evaluation of worsening left lower jaw pain for the past several months.  It is a pulsating pain  that is constant, worse with eating.  No trismus, fevers, chills, neck pain or neck stiffness, difficulty swallowing.      History provided by:  Patient  Dental Pain  Associated symptoms: no congestion, no drooling, no fever and no oral lesions        Review of Systems   Constitutional:  Negative for activity change, appetite change, fatigue and fever.   HENT:  Positive for dental problem. Negative for congestion, drooling, ear pain, mouth sores, nosebleeds, rhinorrhea and sore throat.    Eyes:  Negative for pain and redness.   Respiratory:  Negative for chest tightness, shortness of breath and wheezing.    Cardiovascular:  Negative for chest pain and palpitations.   Gastrointestinal:  Negative for abdominal pain, blood in stool, constipation, diarrhea, nausea and vomiting.   Endocrine: Negative for cold intolerance and heat intolerance.   Genitourinary:  Negative for dysuria, frequency and hematuria.   Musculoskeletal:  Negative for arthralgias and myalgias.   Skin:  Negative for color change, pallor and rash.   Neurological:  Negative for weakness and numbness.   Hematological:  Does not bruise/bleed easily.   Psychiatric/Behavioral:  Negative for agitation, hallucinations and suicidal ideas.            Objective       ED Triage Vitals [12/10/24 1212]   Temperature Pulse Blood Pressure Respirations SpO2 Patient Position - Orthostatic VS   98.1 °F (36.7 °C) 78 144/65 18 100 % Sitting      Temp src Heart Rate Source BP Location FiO2 (%) Pain Score    -- -- Right arm -- --      Vitals      Date and Time Temp Pulse SpO2 Resp BP Pain Score FACES Pain Rating User   12/10/24 1212 98.1 °F (36.7 °C) 78 100 % 18 144/65 -- -- BA            Physical Exam  Constitutional:       Appearance: He is well-developed.   HENT:      Head: Normocephalic and atraumatic.      Comments: No facial swelling, no redness, trismus, evidence of ludwigs angina    Eyes:      General: No scleral icterus.     Conjunctiva/sclera: Conjunctivae normal.    Neck:      Vascular: No JVD.      Trachea: No tracheal deviation.   Pulmonary:      Effort: Pulmonary effort is normal. No respiratory distress.   Abdominal:      General: There is no distension.   Musculoskeletal:         General: No deformity. Normal range of motion.      Cervical back: Normal range of motion and neck supple. No rigidity or tenderness.   Lymphadenopathy:      Cervical: No cervical adenopathy.   Skin:     Coloration: Skin is not pale.      Findings: No erythema or rash.   Neurological:      Mental Status: He is alert and oriented to person, place, and time.   Psychiatric:         Behavior: Behavior normal.         Results Reviewed       None            No orders to display       Procedures    ED Medication and Procedure Management   Prior to Admission Medications   Prescriptions Last Dose Informant Patient Reported? Taking?   ibuprofen (MOTRIN) 600 mg tablet   No No   Sig: Take 1 tablet (600 mg total) by mouth every 6 (six) hours as needed for mild pain or moderate pain   methocarbamol (ROBAXIN) 500 mg tablet   No No   Sig: Take 1 tablet (500 mg total) by mouth 4 (four) times a day for 14 days   naproxen (NAPROSYN) 500 mg tablet   No No   Sig: Take 1 tablet (500 mg total) by mouth 2 (two) times a day with meals   oxyCODONE-acetaminophen (Percocet) 5-325 mg per tablet   No No   Sig: Take 1 tablet by mouth every 4 (four) hours as needed for severe pain Max Daily Amount: 6 tablets   permethrin (ELIMITE) 5 % cream   No No   Sig: Apply to affected area once      Facility-Administered Medications: None     Discharge Medication List as of 12/10/2024 12:59 PM        START taking these medications    Details   chlorhexidine (PERIDEX) 0.12 % solution Apply 15 mL to the mouth or throat 2 (two) times a day, Starting Tue 12/10/2024, Normal      penicillin V potassium (VEETID) 500 mg tablet Take 1 tablet (500 mg total) by mouth 4 (four) times a day for 7 days, Starting Tue 12/10/2024, Until Tue 12/17/2024,  Normal           CONTINUE these medications which have NOT CHANGED    Details   ibuprofen (MOTRIN) 600 mg tablet Take 1 tablet (600 mg total) by mouth every 6 (six) hours as needed for mild pain or moderate pain, Starting Fri 7/19/2024, Normal      methocarbamol (ROBAXIN) 500 mg tablet Take 1 tablet (500 mg total) by mouth 4 (four) times a day for 14 days, Starting Wed 3/20/2024, Until Wed 4/3/2024, Normal      naproxen (NAPROSYN) 500 mg tablet Take 1 tablet (500 mg total) by mouth 2 (two) times a day with meals, Starting Sat 6/15/2024, Normal      oxyCODONE-acetaminophen (Percocet) 5-325 mg per tablet Take 1 tablet by mouth every 4 (four) hours as needed for severe pain Max Daily Amount: 6 tablets, Starting Sat 6/15/2024, Normal      permethrin (ELIMITE) 5 % cream Apply to affected area once, Normal             ED SEPSIS DOCUMENTATION   Time reflects when diagnosis was documented in both MDM as applicable and the Disposition within this note       Time User Action Codes Description Comment    12/10/2024 12:46 PM Frederic Winslow [K02.9] Dental caries     12/10/2024 12:46 PM Frederic Winslow [K08.89] Dentalgia     12/10/2024 12:46 PM Frederic Winslow [K05.10] Gingivitis                  Frederic Winslow MD  12/10/24 0896

## 2025-03-11 ENCOUNTER — TELEPHONE (OUTPATIENT)
Dept: FAMILY MEDICINE CLINIC | Facility: CLINIC | Age: 30
End: 2025-03-11

## 2025-03-11 NOTE — TELEPHONE ENCOUNTER
Received [facility] request from  [employer]  received on 3/11/25. Request was scanned into chart and faxed to [facility].

## 2025-03-13 ENCOUNTER — TELEPHONE (OUTPATIENT)
Dept: FAMILY MEDICINE CLINIC | Facility: CLINIC | Age: 30
End: 2025-03-13

## 2025-03-13 ENCOUNTER — TELEPHONE (OUTPATIENT)
Dept: RHEUMATOLOGY | Facility: CLINIC | Age: 30
End: 2025-03-13

## 2025-03-13 NOTE — TELEPHONE ENCOUNTER
Spoke with patient regarding an appointment with Dr. Mendoza at [facility]. Patient was advised that he needs a referral from his PCP to see a rheumatologist and that this appt needs to be r/s. He stated he waited months for this appt and that he didn't know he needed a referral to see Dr. Mendoza. Patient was informed that his PCP office does the insurance referrals and such. Patient is scheduled incorrectly needs to be put in a NP block.

## 2025-03-13 NOTE — TELEPHONE ENCOUNTER
Pt needs a referral for Rheumatology as soon as possible. Pt has appt scheduled with them for 3/19 and is needing one before his appt. Please let me know if you need any assistance from me. Thank you !

## 2025-03-13 NOTE — TELEPHONE ENCOUNTER
This patient can keep there appt, its not his fault that  made an error and he has been waiting since July.

## 2025-03-13 NOTE — TELEPHONE ENCOUNTER
Dr. Mendoza / [facility]    03/19/25 & Referral    Pt's mother called regarding appt. States she spoke with Dr. Mendoza at her last appt regarding her son's S/S. Per pt's mother, Dr. Mendoza advised her to make an appt with her. After mother's visit with Dr. Mendoza, she went out and scheduled new pt appt for son ( made appt as follow up in error) and she made a follow up for herself. At that time no one advised her she needed a referral for her son.    Today, pt was advised he needed a referral and appt needed to be rescheduled.     Pt's mother placed call to PCP requesting referral for 03/19/25. Also requested PCP order basic labs such as CBC, CMP, Sed Rate, CRP, HEIDE, and RA Factor so pt has done prior to 03/19/25 appt with Dr. Mendoza. Pt is coming in for chronic joint.     Pt and mother asking if appt can please be kept since he has waited a long time and scheduling error was not their fault.    Please advise.

## 2025-03-13 NOTE — TELEPHONE ENCOUNTER
Called pt and left messages explaining that he would philippe to be reschedule in a proper 45 min spot so he can proper;y establish with the provider asked to call the office back to get him schedule in a correct spot offered next sooner with Dr. Mendoza and to be put on cancel list also offered other provider spots to be seen around the same time adivsed to call back office. Keeping eye out for Referral to be placed in pts chart

## 2025-03-14 NOTE — TELEPHONE ENCOUNTER
Called the pt and left voicemail letting him know his appt on the 19th will take place as originally scheduled and has been changed so they have all the time needed to establish with provider, also apologized for all the confusion/stress this may have caused